# Patient Record
Sex: MALE | Race: OTHER | HISPANIC OR LATINO | URBAN - METROPOLITAN AREA
[De-identification: names, ages, dates, MRNs, and addresses within clinical notes are randomized per-mention and may not be internally consistent; named-entity substitution may affect disease eponyms.]

---

## 2021-08-26 PROBLEM — Z00.00 ENCOUNTER FOR PREVENTIVE HEALTH EXAMINATION: Status: ACTIVE | Noted: 2021-08-26

## 2021-09-13 ENCOUNTER — INPATIENT (INPATIENT)
Facility: HOSPITAL | Age: 86
LOS: 3 days | Discharge: HOSPICE | DRG: 56 | End: 2021-09-17
Attending: STUDENT IN AN ORGANIZED HEALTH CARE EDUCATION/TRAINING PROGRAM | Admitting: STUDENT IN AN ORGANIZED HEALTH CARE EDUCATION/TRAINING PROGRAM
Payer: MEDICARE

## 2021-09-13 VITALS
TEMPERATURE: 99 F | OXYGEN SATURATION: 100 % | HEIGHT: 69 IN | WEIGHT: 145.06 LBS | SYSTOLIC BLOOD PRESSURE: 96 MMHG | HEART RATE: 66 BPM | DIASTOLIC BLOOD PRESSURE: 57 MMHG | RESPIRATION RATE: 20 BRPM

## 2021-09-13 LAB
ALBUMIN SERPL ELPH-MCNC: 3.3 G/DL — SIGNIFICANT CHANGE UP (ref 3.3–5)
ALP SERPL-CCNC: 79 U/L — SIGNIFICANT CHANGE UP (ref 40–120)
ALT FLD-CCNC: <5 U/L — LOW (ref 10–45)
ANION GAP SERPL CALC-SCNC: 12 MMOL/L — SIGNIFICANT CHANGE UP (ref 5–17)
APPEARANCE UR: ABNORMAL
AST SERPL-CCNC: 8 U/L — LOW (ref 10–40)
BACTERIA # UR AUTO: ABNORMAL /HPF
BASOPHILS # BLD AUTO: 0.05 K/UL — SIGNIFICANT CHANGE UP (ref 0–0.2)
BASOPHILS NFR BLD AUTO: 0.6 % — SIGNIFICANT CHANGE UP (ref 0–2)
BILIRUB SERPL-MCNC: 0.2 MG/DL — SIGNIFICANT CHANGE UP (ref 0.2–1.2)
BILIRUB UR-MCNC: NEGATIVE — SIGNIFICANT CHANGE UP
BUN SERPL-MCNC: 15 MG/DL — SIGNIFICANT CHANGE UP (ref 7–23)
CALCIUM SERPL-MCNC: 8.7 MG/DL — SIGNIFICANT CHANGE UP (ref 8.4–10.5)
CHLORIDE SERPL-SCNC: 100 MMOL/L — SIGNIFICANT CHANGE UP (ref 96–108)
CK MB CFR SERPL CALC: 2 NG/ML — SIGNIFICANT CHANGE UP (ref 0–6.7)
CK SERPL-CCNC: 35 U/L — SIGNIFICANT CHANGE UP (ref 30–200)
CO2 SERPL-SCNC: 17 MMOL/L — LOW (ref 22–31)
COLOR SPEC: YELLOW — SIGNIFICANT CHANGE UP
CREAT SERPL-MCNC: 2.41 MG/DL — HIGH (ref 0.5–1.3)
DIFF PNL FLD: ABNORMAL
EOSINOPHIL # BLD AUTO: 0.6 K/UL — HIGH (ref 0–0.5)
EOSINOPHIL NFR BLD AUTO: 7.2 % — HIGH (ref 0–6)
EPI CELLS # UR: ABNORMAL /HPF (ref 0–5)
GLUCOSE SERPL-MCNC: 117 MG/DL — HIGH (ref 70–99)
GLUCOSE UR QL: NEGATIVE — SIGNIFICANT CHANGE UP
HCT VFR BLD CALC: 26.1 % — LOW (ref 39–50)
HGB BLD-MCNC: 8.6 G/DL — LOW (ref 13–17)
HYALINE CASTS # UR AUTO: ABNORMAL /LPF (ref 0–2)
IMM GRANULOCYTES NFR BLD AUTO: 0.6 % — SIGNIFICANT CHANGE UP (ref 0–1.5)
KETONES UR-MCNC: NEGATIVE — SIGNIFICANT CHANGE UP
LACTATE SERPL-SCNC: 1 MMOL/L — SIGNIFICANT CHANGE UP (ref 0.5–2)
LEUKOCYTE ESTERASE UR-ACNC: ABNORMAL
LYMPHOCYTES # BLD AUTO: 1.21 K/UL — SIGNIFICANT CHANGE UP (ref 1–3.3)
LYMPHOCYTES # BLD AUTO: 14.6 % — SIGNIFICANT CHANGE UP (ref 13–44)
MCHC RBC-ENTMCNC: 30.3 PG — SIGNIFICANT CHANGE UP (ref 27–34)
MCHC RBC-ENTMCNC: 33 GM/DL — SIGNIFICANT CHANGE UP (ref 32–36)
MCV RBC AUTO: 91.9 FL — SIGNIFICANT CHANGE UP (ref 80–100)
MONOCYTES # BLD AUTO: 0.59 K/UL — SIGNIFICANT CHANGE UP (ref 0–0.9)
MONOCYTES NFR BLD AUTO: 7.1 % — SIGNIFICANT CHANGE UP (ref 2–14)
NEUTROPHILS # BLD AUTO: 5.81 K/UL — SIGNIFICANT CHANGE UP (ref 1.8–7.4)
NEUTROPHILS NFR BLD AUTO: 69.9 % — SIGNIFICANT CHANGE UP (ref 43–77)
NITRITE UR-MCNC: NEGATIVE — SIGNIFICANT CHANGE UP
NRBC # BLD: 0 /100 WBCS — SIGNIFICANT CHANGE UP (ref 0–0)
PH UR: 5 — SIGNIFICANT CHANGE UP (ref 5–8)
PLATELET # BLD AUTO: 352 K/UL — SIGNIFICANT CHANGE UP (ref 150–400)
POTASSIUM SERPL-MCNC: 4.3 MMOL/L — SIGNIFICANT CHANGE UP (ref 3.5–5.3)
POTASSIUM SERPL-SCNC: 4.3 MMOL/L — SIGNIFICANT CHANGE UP (ref 3.5–5.3)
PROT SERPL-MCNC: 7 G/DL — SIGNIFICANT CHANGE UP (ref 6–8.3)
PROT UR-MCNC: 100 MG/DL
RBC # BLD: 2.84 M/UL — LOW (ref 4.2–5.8)
RBC # FLD: 16.1 % — HIGH (ref 10.3–14.5)
RBC CASTS # UR COMP ASSIST: ABNORMAL /HPF
SARS-COV-2 RNA SPEC QL NAA+PROBE: NEGATIVE — SIGNIFICANT CHANGE UP
SODIUM SERPL-SCNC: 129 MMOL/L — LOW (ref 135–145)
SP GR SPEC: >=1.03 — SIGNIFICANT CHANGE UP (ref 1–1.03)
TROPONIN T SERPL-MCNC: 0.08 NG/ML — CRITICAL HIGH (ref 0–0.01)
TROPONIN T SERPL-MCNC: 0.08 NG/ML — CRITICAL HIGH (ref 0–0.01)
UROBILINOGEN FLD QL: 0.2 E.U./DL — SIGNIFICANT CHANGE UP
WBC # BLD: 8.31 K/UL — SIGNIFICANT CHANGE UP (ref 3.8–10.5)
WBC # FLD AUTO: 8.31 K/UL — SIGNIFICANT CHANGE UP (ref 3.8–10.5)
WBC UR QL: ABNORMAL /HPF

## 2021-09-13 PROCEDURE — 71045 X-RAY EXAM CHEST 1 VIEW: CPT | Mod: 26

## 2021-09-13 PROCEDURE — 99285 EMERGENCY DEPT VISIT HI MDM: CPT

## 2021-09-13 PROCEDURE — 93010 ELECTROCARDIOGRAM REPORT: CPT

## 2021-09-13 PROCEDURE — 99223 1ST HOSP IP/OBS HIGH 75: CPT | Mod: GC

## 2021-09-13 RX ORDER — SODIUM CHLORIDE 9 MG/ML
1000 INJECTION INTRAMUSCULAR; INTRAVENOUS; SUBCUTANEOUS ONCE
Refills: 0 | Status: COMPLETED | OUTPATIENT
Start: 2021-09-13 | End: 2021-09-13

## 2021-09-13 RX ADMIN — SODIUM CHLORIDE 1000 MILLILITER(S): 9 INJECTION INTRAMUSCULAR; INTRAVENOUS; SUBCUTANEOUS at 21:02

## 2021-09-13 NOTE — ED PROVIDER NOTE - PHYSICAL EXAMINATION
VITAL SIGNS: I have reviewed nursing notes and confirm.  CONSTITUTIONAL: Well-developed;  in no acute distress.   SKIN:  warm and dry, no acute rash. He has no evidence of decubitus ulcerations.   HEAD:  normocephalic, atraumatic.  EYES: PERRL, EOM intact; conjunctiva and sclera clear.  ENT: Dry mucous membranes. No nasal discharge; airway clear.   NECK: Supple; non tender.  CARD: S1, S2 normal; no murmurs, gallops, or rubs. Regular rate and rhythm.   RESP:  Clear to auscultation b/l, no wheezes, rales or rhonchi.  ABD: Normal bowel sounds; soft; non-distended; non-tender; no guarding/ rebound.  : Saleh catheter in place, draining clear yellow urine.   EXT: Normal ROM. No clubbing, cyanosis or edema. 2+ pulses to b/l ue/le.  NEURO: Alert, oriented x1, grossly unremarkable. Moving all extremities.   PSYCH: Cooperative, mood and affect appropriate.

## 2021-09-13 NOTE — H&P ADULT - PROBLEM SELECTOR PLAN 6
- C/w Carvedilol 12.5mg BID, Nifedipine 30mg qd   - Will hold Candesartan 4mg although it appears pt's Cr is at baseline. Can restart if needed

## 2021-09-13 NOTE — H&P ADULT - ATTENDING COMMENTS
:   reviewed pertinent data , h&p  patient seen and examined overnight     1.FTT/ UTI : on ceftriaxone , followup uctx , pending palliative / speech and swallow consults       rest of  plan as above  : [id number]  reviewed pertinent data , h&p  patient seen and examined overnight   pt w/ no complaints, oriented to person, and place ("hospital"), rest of exam as above    1.FTT/ UTI : on ceftriaxone , followup blood and urine ctx , pending palliative / speech and swallow consults       rest of  plan as above

## 2021-09-13 NOTE — ED PROVIDER NOTE - OBJECTIVE STATEMENT
87yo Uzbek-speaking male with pmhx of BPH with chronic indwelling yun catheter (last exchanged 1.5wk ago by VNS), recurrent UTI (last admission 3wk ago at [facility], unknown antibiotic therapy per son, not currently on antibiotics), dementia (alert, oriented to person at baseline, bedbound and dependent in ADLs), CKD, DM2, hyponatremia presents with 3 days of decreased po intake, increased weakness and low blood pressure. History provided by pt's son at bedside. He states that since pt was discharged from [facility], pt has been bedbound. Son has HHA services during the day and cares for pt at night. He became concerned after pt has refused food over the past 2 days and has seemed most confused and weak than baseline. He has been pushing po fluids and pt has been taking his home medications. Son decided to bring pt to ED after he had low blood pressures at home. Pt is on antihypertensive therapy (carvedilol and nifedipine). Pt's son is HCP. He is DNR/DNI and has MOLST at bedside. 87yo Kyrgyz-speaking male with pmhx of BPH with chronic indwelling yun catheter (last exchanged 1.5wk ago by VNS), recurrent UTI (last admission 3wk ago at [facility], unknown antibiotic therapy per son, not currently on antibiotics), dementia (alert, oriented to person at baseline, bedbound and dependent in ADLs), CKD, DM2, hyponatremia presents with 3 days of decreased po intake, increased weakness and low blood pressure. History provided by pt's son at bedside. He states that since pt was discharged from [facility] he has become progressively bedbound. Son has HHA services during the day and cares for pt at night. He became concerned after pt has refused food over the past 2-3 days and has seemed most confused and weak than baseline. Pt has been taking his home medications. Son decided to bring pt to ED after he had low blood pressures (SBP <100mmHg) at home. Pt is on antihypertensive therapy (carvedilol and nifedipine). Pt's son is HCP. He is DNR/DNI and has MOLST at bedside.

## 2021-09-13 NOTE — H&P ADULT - PROBLEM SELECTOR PLAN 11
F: none  E: replete K <4, Mg <2  N: NPO   GI Ppx: Protonix   DVT Ppx: Lovenox   Code status: FULL   Dispo: [facility]

## 2021-09-13 NOTE — H&P ADULT - PROBLEM SELECTOR PLAN 4
Hgb 8.6. Hgb 8.6. Per HIE appears baseline is 7-8. No sign/symptoms of bleeding.   - Monitor CBC   - Maintain active type and screen  - Transfuse for Hgb <7   - F/u iron panel   - F/u B12 and folate

## 2021-09-13 NOTE — H&P ADULT - PROBLEM SELECTOR PLAN 2
H/o recurrent UTIs. Has been hospitalized 3-4x this yr for UTI. Last admission at [facility] 3 weeks ago. During that admission he received IV Abx and was sent home with 1 more day of po Abx. UA positive, although contaminated, likely in setting of chronic yun. Presenting symp could be 2/2 UTI  - Management as above. Would only tx for 3 days

## 2021-09-13 NOTE — H&P ADULT - PROBLEM SELECTOR PLAN 7
Not currently on any medication. Per HIE, glu on bmp is usually . Will hold off on mISS and frequent FSG for now   - F/u HgbA1c

## 2021-09-13 NOTE — H&P ADULT - PROBLEM SELECTOR PLAN 8
H/o chronic hyponatremia. Per HIE Na+ 127-131. Na+ currently at baseline, 129. On Sodium chloride tabs.   - C/w sodium chloride 1g BID   - Monitor BMP

## 2021-09-13 NOTE — H&P ADULT - PROBLEM SELECTOR PLAN 3
AAOx1 at baseline. Concern FTT is 2/2 progressive dementia. Pt is DNR/DNI (MOLST form scanned into alpha) and discussion had with family about hospice, as pt now hospice deepali. Paperwork from PMD says he has been progressively declining over past few months.   - Palliative consult as above   - Obtain collateral from PMD Dr. Maggi Levi AAOx1 at baseline. Concern FTT is 2/2 progressive dementia. Pt is DNR/DNI (MOLST form scanned into alpha) and discussion had with family about hospice, as pt now hospice eligible. Paperwork from PMD says he has been progressively declining over past few months.   - Palliative consult as above   - Obtain collateral from PMD Dr. Maggi Levi

## 2021-09-13 NOTE — ED PROVIDER NOTE - CLINICAL SUMMARY MEDICAL DECISION MAKING FREE TEXT BOX
Pt is afebrile and hemodynamically stable. He has no acute ECG changes. Troponin elevated, but stable on repeat and is likely related to demand ischemia vs decreased renal function. Pt anemic at baseline, Hgb 8.6 today improved from previous. Lactate wnl. He has stable hyponatremia and Cr. Urinalysis with few WBCs, leuk esterase. Urine culture pending. CXR without acute cardiopulmonary abnormality. COVID neg. Blood cultures pending.     discussed all results with pt's son who is at bedside. Feels father has had functional decline at home and is currently having difficulty managing his care. Will admit to Medicine for decreased po intake/weakness.

## 2021-09-13 NOTE — H&P ADULT - NSHPPHYSICALEXAM_GEN_ALL_CORE
VITAL SIGNS:  T(C): 36.4 (09-14-21 @ 03:15), Max: 37 (09-13-21 @ 18:28)  T(F): 97.5 (09-14-21 @ 03:15), Max: 98.6 (09-13-21 @ 18:28)  HR: 66 (09-14-21 @ 03:15) (66 - 69)  BP: 110/56 (09-14-21 @ 03:15) (96/57 - 115/68)  BP(mean): --  RR: 18 (09-14-21 @ 03:15) (17 - 20)  SpO2: 98% (09-14-21 @ 03:15) (98% - 100%)  Wt(kg): --    PHYSICAL EXAM:    Constitutional: elderly male, resting comfortably in bed; NAD  Head: NC/AT  Eyes: PERRL, EOMI, anicteric sclera  ENT: no nasal discharge; uvula midline, no oropharyngeal erythema or exudates; MMM  Neck: supple  Respiratory: CTA B/L; no W/R/R, no retractions  Cardiac: +S1/S2; RRR; no M/R/G; PMI non-displaced  Gastrointestinal: abdomen soft, NT/ND; no rebound or guarding; +BSx4  : yun in place   Extremities: WWP, no clubbing or cyanosis; no peripheral edema  Musculoskeletal: Moves all extremities no joint swelling, tenderness or erythema  Vascular: 2+ radial, femoral, DP/PT pulses B/L  Dermatologic: skin warm, dry and intact; no rashes, wounds, or scars  Neurologic: AAOx1; CNII-XII grossly intact; no focal deficits  Psychiatric: affect and characteristics of appearance, verbalizations, behaviors are appropriate

## 2021-09-13 NOTE — ED ADULT TRIAGE NOTE - OTHER COMPLAINTS
biba from home accompanied by son for decrease appetite x 1 week per son.  Pt has been tolerating some fluids but no solids.  PT had some vomiting earlier in the week.  PT is prone to UTI's, has yun catheter in place.   PT has hx of dementia confused at baseline.

## 2021-09-13 NOTE — H&P ADULT - PROBLEM SELECTOR PLAN 1
Presenting with 3 days of decrease po intake. ROS neg. UA positive, in setting of chronic yun (last replaced 1.5 weeks ago), although contaminated. CXR clear, lactate wnl. FTT likely 2/2 UTI vs progressive dementia. Pt's son spoke with PMD 1 month ago about hospice as pt is hospice eligible per paperwork brought in from PMD Dr. Maggi Levi.   - Ceftriaxone 1g qd x 3 days  - F/u Bcxs, Ucxs    - Palliative consulted   - Speech and swallow consulted as pt high risk for aspiration. Will keep NPO for now

## 2021-09-13 NOTE — H&P ADULT - ASSESSMENT
Pt is an 87yo Bengali speaking male PMH dementia (AAOx1 at baseline, bedbound), BPH with chronic yun (last exchanged 2.5 weeks ago by VNS), recurrent UTIs, DM2, CKD stage 4, neuropathy, chronic hyponatremia, cervical spine dz w/ chronic pain presenting for decreased po intake. Symptoms likely FTT in setting of progression dementia along with UTI (UA positive). Pt will be admitted for further management.

## 2021-09-13 NOTE — ED PROVIDER NOTE - NSICDXPASTMEDICALHX_GEN_ALL_CORE_FT
PAST MEDICAL HISTORY:  CKD (chronic kidney disease)     Diabetes mellitus     History of BPH     HTN (hypertension)     Hyponatremia     Indwelling Saleh catheter present

## 2021-09-13 NOTE — ED ADULT NURSE NOTE - NSIMPLEMENTINTERV_GEN_ALL_ED
Implemented All Fall with Harm Risk Interventions:  [place] to call system. Call bell, personal items and telephone within reach. Instruct patient to call for assistance. Room bathroom lighting operational. Non-slip footwear when patient is off stretcher. Physically safe environment: no spills, clutter or unnecessary equipment. Stretcher in lowest position, wheels locked, appropriate side rails in place. Provide visual cue, wrist band, yellow gown, etc. Monitor gait and stability. Monitor for mental status changes and reorient to person, place, and time. Review medications for side effects contributing to fall risk. Reinforce activity limits and safety measures with patient and family. Provide visual clues: red socks.

## 2021-09-13 NOTE — ED ADULT NURSE NOTE - OBJECTIVE STATEMENT
Patient presents with son in NAD.  As per son patient has not eaten x 3 days.  He admits to 1 episode of vomiting last Thursday.  He denies, fevers, chills, AMS, abdominal pain, chest pain, SOB, palpitations.  He is fully covid 19 vaccinate.d

## 2021-09-13 NOTE — H&P ADULT - HISTORY OF PRESENT ILLNESS
Pt is an 85yo Turkmen speaking male [facility] dementia (AAOx1 at baseline, bedbound), BPH with chronic yun (last exchanged 2.5 weeks ago by VNS), recurrent UTIs, DM2, CKD stage 4, neuropathy, chronic hyponatremia, cervical spine dz w/ chronic pain presenting for decreased po intake. History obtained from pt's son Fer Perez at bedside. Pt had decreased po intake for the past 3 days. Otherwise denies fevers, chills, HA, chest pain, sob, nausea, vomiting, abdominal pain, diarrhea, constipation, dysuria. Pt has been hospitalized at [facility] 3-4x this yr for recurrent UTIs, most recently 3 weeks ago. His son is concerned that his symptoms are related to progressive dementia rather than another UTI. Last month he spoke to the PMD Dr. Levi ([facility] house call physician) who discussed hospice with the son.  Pt currently lives with his son Devon who helps take care of pt. He has a HHA for 10hrs 7 days per week. He is interested in obtaining more care for his father and speaking further about hospice.     ED Course   VS: 98.6, 66, 96/57->112/61, 20, 100%  Labs: hgb 8.6, hct 26.1, eosinophils 600, Na+ 129, HCO3- 17, Cr 2.41, Glu 117, Trops 0.08, UA- positive   Imaging: CXR- clear, EKG- nsr with 1st deg av block   Received: 1L NS      Pt is an 87yo Greek speaking male [facility] dementia (AAOx1 at baseline, bedbound), BPH with chronic yun (last exchanged 1.5 weeks ago by VNS), recurrent UTIs, DM2, CKD stage 4, neuropathy, chronic hyponatremia, cervical spine dz w/ chronic pain presenting for decreased po intake. History obtained from pt's son Fer Perez at bedside. Pt had decreased po intake for the past 3 days. Otherwise denies fevers, chills, HA, chest pain, sob, nausea, vomiting, abdominal pain, diarrhea, constipation, dysuria. Pt has been hospitalized at [facility] 3-4x this yr for recurrent UTIs, most recently 3 weeks ago. His son is concerned that his symptoms are related to progressive dementia rather than another UTI. Last month he spoke to the PMD Dr. Levi ([facility] house call physician) who discussed hospice with the son.  Pt currently lives with his son Devon who helps take care of pt. He has a HHA for 10hrs 7 days per week. He is interested in obtaining more care for his father and speaking further about hospice.     ED Course   VS: 98.6, 66, 96/57->112/61, 20, 100%  Labs: hgb 8.6, hct 26.1, eosinophils 600, Na+ 129, HCO3- 17, Cr 2.41, Glu 117, Trops 0.08, UA- positive   Imaging: CXR- clear, EKG- nsr with 1st deg av block   Received: 1L NS

## 2021-09-13 NOTE — H&P ADULT - NSHPLABSRESULTS_GEN_ALL_CORE
LABS:                         8.6    8.31  )-----------( 352      ( 13 Sep 2021 20:08 )             26.1     09-13    129<L>  |  100  |  15  ----------------------------<  117<H>  4.3   |  17<L>  |  2.41<H>    Ca    8.7      13 Sep 2021 20:08    TPro  7.0  /  Alb  3.3  /  TBili  0.2  /  DBili  x   /  AST  8<L>  /  ALT  <5<L>  /  AlkPhos  79  09-13      Urinalysis Basic - ( 13 Sep 2021 21:48 )    Color: Yellow / Appearance: Cloudy / SG: >=1.030 / pH: x  Gluc: x / Ketone: NEGATIVE  / Bili: Negative / Urobili: 0.2 E.U./dL   Blood: x / Protein: 100 mg/dL / Nitrite: NEGATIVE   Leuk Esterase: Small / RBC: 5-10 /HPF / WBC Many /HPF   Sq Epi: x / Non Sq Epi: 5-10 /HPF / Bacteria: Many /HPF      CARDIAC MARKERS ( 13 Sep 2021 21:10 )  x     / 0.08 ng/mL / x     / x     / x      CARDIAC MARKERS ( 13 Sep 2021 20:08 )  x     / 0.08 ng/mL / 35 U/L / x     / 2.0 ng/mL        Lactate, Blood: 1.0 mmol/L (09-13 @ 20:06)      RADIOLOGY, EKG & ADDITIONAL TESTS: Reviewed

## 2021-09-13 NOTE — H&P ADULT - NSHPSOCIALHISTORY_GEN_ALL_CORE
Lives with son. Bedbound. Has HHA 10hrs per day, 7 days per week.     Denies tobacco, rec drug use, alcohol use

## 2021-09-13 NOTE — H&P ADULT - NSICDXPASTMEDICALHX_GEN_ALL_CORE_FT
PAST MEDICAL HISTORY:  CKD (chronic kidney disease)     Dementia     Diabetes mellitus     History of BPH     HTN (hypertension)     Hyponatremia     Indwelling Saleh catheter present

## 2021-09-14 DIAGNOSIS — F03.90 UNSPECIFIED DEMENTIA WITHOUT BEHAVIORAL DISTURBANCE: ICD-10-CM

## 2021-09-14 DIAGNOSIS — R63.8 OTHER SYMPTOMS AND SIGNS CONCERNING FOOD AND FLUID INTAKE: ICD-10-CM

## 2021-09-14 DIAGNOSIS — R62.7 ADULT FAILURE TO THRIVE: ICD-10-CM

## 2021-09-14 DIAGNOSIS — D63.8 ANEMIA IN OTHER CHRONIC DISEASES CLASSIFIED ELSEWHERE: ICD-10-CM

## 2021-09-14 DIAGNOSIS — T83.511A INFECTION AND INFLAMMATORY REACTION DUE TO INDWELLING URETHRAL CATHETER, INITIAL ENCOUNTER: ICD-10-CM

## 2021-09-14 DIAGNOSIS — N18.4 CHRONIC KIDNEY DISEASE, STAGE 4 (SEVERE): ICD-10-CM

## 2021-09-14 DIAGNOSIS — Z98.890 OTHER SPECIFIED POSTPROCEDURAL STATES: Chronic | ICD-10-CM

## 2021-09-14 DIAGNOSIS — D64.9 ANEMIA, UNSPECIFIED: ICD-10-CM

## 2021-09-14 DIAGNOSIS — G89.29 OTHER CHRONIC PAIN: ICD-10-CM

## 2021-09-14 DIAGNOSIS — E83.42 HYPOMAGNESEMIA: ICD-10-CM

## 2021-09-14 DIAGNOSIS — N40.0 BENIGN PROSTATIC HYPERPLASIA WITHOUT LOWER URINARY TRACT SYMPTOMS: ICD-10-CM

## 2021-09-14 DIAGNOSIS — E87.1 HYPO-OSMOLALITY AND HYPONATREMIA: ICD-10-CM

## 2021-09-14 DIAGNOSIS — E11.9 TYPE 2 DIABETES MELLITUS WITHOUT COMPLICATIONS: ICD-10-CM

## 2021-09-14 DIAGNOSIS — N39.0 URINARY TRACT INFECTION, SITE NOT SPECIFIED: ICD-10-CM

## 2021-09-14 DIAGNOSIS — I10 ESSENTIAL (PRIMARY) HYPERTENSION: ICD-10-CM

## 2021-09-14 LAB
A1C WITH ESTIMATED AVERAGE GLUCOSE RESULT: 4.9 % — SIGNIFICANT CHANGE UP (ref 4–5.6)
ANION GAP SERPL CALC-SCNC: 12 MMOL/L — SIGNIFICANT CHANGE UP (ref 5–17)
BASOPHILS # BLD AUTO: 0.05 K/UL — SIGNIFICANT CHANGE UP (ref 0–0.2)
BASOPHILS NFR BLD AUTO: 0.5 % — SIGNIFICANT CHANGE UP (ref 0–2)
BLD GP AB SCN SERPL QL: NEGATIVE — SIGNIFICANT CHANGE UP
BUN SERPL-MCNC: 17 MG/DL — SIGNIFICANT CHANGE UP (ref 7–23)
CALCIUM SERPL-MCNC: 8.3 MG/DL — LOW (ref 8.4–10.5)
CHLORIDE SERPL-SCNC: 102 MMOL/L — SIGNIFICANT CHANGE UP (ref 96–108)
CO2 SERPL-SCNC: 16 MMOL/L — LOW (ref 22–31)
COVID-19 SPIKE DOMAIN AB INTERP: POSITIVE
COVID-19 SPIKE DOMAIN ANTIBODY RESULT: 164 U/ML — HIGH
CREAT SERPL-MCNC: 2.48 MG/DL — HIGH (ref 0.5–1.3)
EOSINOPHIL # BLD AUTO: 0.68 K/UL — HIGH (ref 0–0.5)
EOSINOPHIL NFR BLD AUTO: 6.5 % — HIGH (ref 0–6)
ESTIMATED AVERAGE GLUCOSE: 94 MG/DL — SIGNIFICANT CHANGE UP (ref 68–114)
FERRITIN SERPL-MCNC: 387 NG/ML — SIGNIFICANT CHANGE UP (ref 30–400)
FOLATE SERPL-MCNC: 19.3 NG/ML — SIGNIFICANT CHANGE UP
GLUCOSE SERPL-MCNC: 89 MG/DL — SIGNIFICANT CHANGE UP (ref 70–99)
HCT VFR BLD CALC: 25.7 % — LOW (ref 39–50)
HGB BLD-MCNC: 8.2 G/DL — LOW (ref 13–17)
IMM GRANULOCYTES NFR BLD AUTO: 0.5 % — SIGNIFICANT CHANGE UP (ref 0–1.5)
IRON SATN MFR SERPL: 40 % — SIGNIFICANT CHANGE UP (ref 16–55)
IRON SATN MFR SERPL: 51 UG/DL — SIGNIFICANT CHANGE UP (ref 45–165)
LYMPHOCYTES # BLD AUTO: 1.25 K/UL — SIGNIFICANT CHANGE UP (ref 1–3.3)
LYMPHOCYTES # BLD AUTO: 12 % — LOW (ref 13–44)
MAGNESIUM SERPL-MCNC: 1.5 MG/DL — LOW (ref 1.6–2.6)
MCHC RBC-ENTMCNC: 29.5 PG — SIGNIFICANT CHANGE UP (ref 27–34)
MCHC RBC-ENTMCNC: 31.9 GM/DL — LOW (ref 32–36)
MCV RBC AUTO: 92.4 FL — SIGNIFICANT CHANGE UP (ref 80–100)
MONOCYTES # BLD AUTO: 0.67 K/UL — SIGNIFICANT CHANGE UP (ref 0–0.9)
MONOCYTES NFR BLD AUTO: 6.4 % — SIGNIFICANT CHANGE UP (ref 2–14)
NEUTROPHILS # BLD AUTO: 7.7 K/UL — HIGH (ref 1.8–7.4)
NEUTROPHILS NFR BLD AUTO: 74.1 % — SIGNIFICANT CHANGE UP (ref 43–77)
NRBC # BLD: 0 /100 WBCS — SIGNIFICANT CHANGE UP (ref 0–0)
PLATELET # BLD AUTO: 382 K/UL — SIGNIFICANT CHANGE UP (ref 150–400)
POTASSIUM SERPL-MCNC: 4 MMOL/L — SIGNIFICANT CHANGE UP (ref 3.5–5.3)
POTASSIUM SERPL-SCNC: 4 MMOL/L — SIGNIFICANT CHANGE UP (ref 3.5–5.3)
RBC # BLD: 2.78 M/UL — LOW (ref 4.2–5.8)
RBC # FLD: 15.9 % — HIGH (ref 10.3–14.5)
RH IG SCN BLD-IMP: POSITIVE — SIGNIFICANT CHANGE UP
SARS-COV-2 IGG+IGM SERPL QL IA: 164 U/ML — HIGH
SARS-COV-2 IGG+IGM SERPL QL IA: POSITIVE
SODIUM SERPL-SCNC: 130 MMOL/L — LOW (ref 135–145)
TIBC SERPL-MCNC: 129 UG/DL — LOW (ref 220–430)
UIBC SERPL-MCNC: 78 UG/DL — LOW (ref 110–370)
VIT B12 SERPL-MCNC: 737 PG/ML — SIGNIFICANT CHANGE UP (ref 232–1245)
WBC # BLD: 10.4 K/UL — SIGNIFICANT CHANGE UP (ref 3.8–10.5)
WBC # FLD AUTO: 10.4 K/UL — SIGNIFICANT CHANGE UP (ref 3.8–10.5)

## 2021-09-14 PROCEDURE — 51703 INSERT BLADDER CATH COMPLEX: CPT

## 2021-09-14 PROCEDURE — 99223 1ST HOSP IP/OBS HIGH 75: CPT

## 2021-09-14 PROCEDURE — 99233 SBSQ HOSP IP/OBS HIGH 50: CPT | Mod: GC

## 2021-09-14 PROCEDURE — 99358 PROLONG SERVICE W/O CONTACT: CPT

## 2021-09-14 RX ORDER — TAMSULOSIN HYDROCHLORIDE 0.4 MG/1
0.4 CAPSULE ORAL EVERY 12 HOURS
Refills: 0 | Status: DISCONTINUED | OUTPATIENT
Start: 2021-09-14 | End: 2021-09-17

## 2021-09-14 RX ORDER — SIMVASTATIN 20 MG/1
10 TABLET, FILM COATED ORAL AT BEDTIME
Refills: 0 | Status: DISCONTINUED | OUTPATIENT
Start: 2021-09-14 | End: 2021-09-17

## 2021-09-14 RX ORDER — MAGNESIUM SULFATE 500 MG/ML
2 VIAL (ML) INJECTION ONCE
Refills: 0 | Status: COMPLETED | OUTPATIENT
Start: 2021-09-14 | End: 2021-09-14

## 2021-09-14 RX ORDER — POLYETHYLENE GLYCOL 3350 17 G/17G
17 POWDER, FOR SOLUTION ORAL EVERY 24 HOURS
Refills: 0 | Status: DISCONTINUED | OUTPATIENT
Start: 2021-09-14 | End: 2021-09-15

## 2021-09-14 RX ORDER — SODIUM CHLORIDE 9 MG/ML
1 INJECTION INTRAMUSCULAR; INTRAVENOUS; SUBCUTANEOUS EVERY 12 HOURS
Refills: 0 | Status: DISCONTINUED | OUTPATIENT
Start: 2021-09-14 | End: 2021-09-17

## 2021-09-14 RX ORDER — CARVEDILOL PHOSPHATE 80 MG/1
0 CAPSULE, EXTENDED RELEASE ORAL
Qty: 0 | Refills: 0 | DISCHARGE

## 2021-09-14 RX ORDER — CARVEDILOL PHOSPHATE 80 MG/1
12.5 CAPSULE, EXTENDED RELEASE ORAL EVERY 12 HOURS
Refills: 0 | Status: DISCONTINUED | OUTPATIENT
Start: 2021-09-14 | End: 2021-09-17

## 2021-09-14 RX ORDER — ACETAMINOPHEN 500 MG
650 TABLET ORAL EVERY 6 HOURS
Refills: 0 | Status: DISCONTINUED | OUTPATIENT
Start: 2021-09-14 | End: 2021-09-17

## 2021-09-14 RX ORDER — FINASTERIDE 5 MG/1
5 TABLET, FILM COATED ORAL DAILY
Refills: 0 | Status: DISCONTINUED | OUTPATIENT
Start: 2021-09-14 | End: 2021-09-17

## 2021-09-14 RX ORDER — NIFEDIPINE 30 MG
30 TABLET, EXTENDED RELEASE 24 HR ORAL DAILY
Refills: 0 | Status: DISCONTINUED | OUTPATIENT
Start: 2021-09-14 | End: 2021-09-17

## 2021-09-14 RX ORDER — ENOXAPARIN SODIUM 100 MG/ML
30 INJECTION SUBCUTANEOUS EVERY 24 HOURS
Refills: 0 | Status: DISCONTINUED | OUTPATIENT
Start: 2021-09-14 | End: 2021-09-17

## 2021-09-14 RX ORDER — CEFTRIAXONE 500 MG/1
1000 INJECTION, POWDER, FOR SOLUTION INTRAMUSCULAR; INTRAVENOUS EVERY 24 HOURS
Refills: 0 | Status: DISCONTINUED | OUTPATIENT
Start: 2021-09-14 | End: 2021-09-15

## 2021-09-14 RX ORDER — PANTOPRAZOLE SODIUM 20 MG/1
40 TABLET, DELAYED RELEASE ORAL
Refills: 0 | Status: DISCONTINUED | OUTPATIENT
Start: 2021-09-14 | End: 2021-09-17

## 2021-09-14 RX ADMIN — ENOXAPARIN SODIUM 30 MILLIGRAM(S): 100 INJECTION SUBCUTANEOUS at 07:13

## 2021-09-14 RX ADMIN — Medication 50 GRAM(S): at 09:33

## 2021-09-14 RX ADMIN — PANTOPRAZOLE SODIUM 40 MILLIGRAM(S): 20 TABLET, DELAYED RELEASE ORAL at 07:06

## 2021-09-14 RX ADMIN — TAMSULOSIN HYDROCHLORIDE 0.4 MILLIGRAM(S): 0.4 CAPSULE ORAL at 07:06

## 2021-09-14 RX ADMIN — TAMSULOSIN HYDROCHLORIDE 0.4 MILLIGRAM(S): 0.4 CAPSULE ORAL at 18:07

## 2021-09-14 RX ADMIN — SIMVASTATIN 10 MILLIGRAM(S): 20 TABLET, FILM COATED ORAL at 22:25

## 2021-09-14 RX ADMIN — FINASTERIDE 5 MILLIGRAM(S): 5 TABLET, FILM COATED ORAL at 11:46

## 2021-09-14 RX ADMIN — SODIUM CHLORIDE 1 GRAM(S): 9 INJECTION INTRAMUSCULAR; INTRAVENOUS; SUBCUTANEOUS at 07:06

## 2021-09-14 RX ADMIN — SODIUM CHLORIDE 1 GRAM(S): 9 INJECTION INTRAMUSCULAR; INTRAVENOUS; SUBCUTANEOUS at 18:07

## 2021-09-14 RX ADMIN — CEFTRIAXONE 100 MILLIGRAM(S): 500 INJECTION, POWDER, FOR SOLUTION INTRAMUSCULAR; INTRAVENOUS at 04:03

## 2021-09-14 RX ADMIN — POLYETHYLENE GLYCOL 3350 17 GRAM(S): 17 POWDER, FOR SOLUTION ORAL at 07:06

## 2021-09-14 RX ADMIN — CARVEDILOL PHOSPHATE 12.5 MILLIGRAM(S): 80 CAPSULE, EXTENDED RELEASE ORAL at 18:07

## 2021-09-14 NOTE — SWALLOW BEDSIDE ASSESSMENT ADULT - ORAL PHASE
with solids only due to poor dentition and difficulty with mastication/Decreased anterior-posterior movement of the bolus/Delayed oral transit time

## 2021-09-14 NOTE — PROGRESS NOTE ADULT - PROBLEM SELECTOR PLAN 11
F: none  E: replete K <4, Mg <2  N: NPO   GI Ppx: Protonix   DVT Ppx: Lovenox   Code status: FULL   Dispo: [facility]

## 2021-09-14 NOTE — PROGRESS NOTE ADULT - PROBLEM SELECTOR PLAN 4
Hgb 8.6. Per HIE appears baseline is 7-8. No sign/symptoms of bleeding.   - Monitor CBC   - Maintain active type and screen  - Transfuse for Hgb <7   - F/u iron panel   - F/u B12 and folate Hgb 8.6. Per HIE appears baseline is 7-8. No sign/symptoms of bleeding.   - Monitor CBC   - Maintain active type and screen  - Transfuse for Hgb <7

## 2021-09-14 NOTE — DIETITIAN NUTRITION RISK NOTIFICATION - TREATMENT: THE FOLLOWING DIET HAS BEEN RECOMMENDED
1. Adv diet when medically feasible to regular diet, consistency per SLP recs   2. rec include [id number] (220 kcals, 10 g protein, 200 H2O), and ensure pudding 1xday (170 kcals, 4 g protein).   3. Monitor %PO intake, and tolerance to ONS   4. Maintain aspiration precautions, elevate "[employer] for meals, appreciate assistance durign meal times   5. Monitor BMp, BG q6hrs, POCT, renal indices, lytes, replete prn       1. Adv diet when medically feasible to regular diet, consistency per SLP recs   2. rec include [id number] (220 kcals, 10 g protein, 200 H2O), and ensure pudding 1xday (170 kcals, 4 g protein).   3. Monitor %PO intake, and tolerance to ONS   4. Maintain aspiration precautions, elevate [place] [employer] for meals, appreciate assistance during meal times   5. Monitor BMp, BG q6hrs, POCT, renal indices, lytes, replete prn

## 2021-09-14 NOTE — PROGRESS NOTE ADULT - PROBLEM SELECTOR PLAN 7
Not currently on any home medication. Per HIE, glu on bmp is usually . Will hold off on mISS and frequent FSG for now   - F/u HgbA1c

## 2021-09-14 NOTE — PROGRESS NOTE ADULT - PROBLEM SELECTOR PLAN 6
- C/w home meds Carvedilol 12.5mg BID, Nifedipine 30mg qd   - Will hold Candesartan 4mg although it appears pt's Cr is at baseline. Can restart if needed

## 2021-09-14 NOTE — CONSULT NOTE ADULT - PROBLEM SELECTOR RECOMMENDATION 4
- Patient with advanced Alzheimer's dementia, FTT, frequent readmissions.  - DNR/DNI.  - MOLST in chart.  - Family meeting tomorrow morning.

## 2021-09-14 NOTE — DIETITIAN INITIAL EVALUATION ADULT. - MALNUTRITION
severe protein calorie malnutrition Meets criteria for PCM in the setting of chronic illness (FTT, dementia) as evidenced by meet <50% EER via PO intake and mod-severe fat and muscle wasting

## 2021-09-14 NOTE — CONSULT NOTE ADULT - PROBLEM SELECTOR RECOMMENDATION 9
- Patient with evidence of progressive functional, cognitive and nutritional decline.  - Secondary to advanced dementia.  - Evidence of cachexia, anasarca.

## 2021-09-14 NOTE — PROGRESS NOTE ADULT - SUBJECTIVE AND OBJECTIVE BOX
Patient is a 86y old  Male who presents with a chief complaint of FTT,UTI (14 Sep 2021 08:38)      INTERVAL HPI/OVERNIGHT EVENTS:    Pt. seen and examined earlier today  Pt. says he feels well, no complaints elicited  ROS limited d/t dementia  Reportedly w/ decreased PO intake prior to admisison    Review of Systems: 12 point review of systems otherwise negative    MEDICATIONS  (STANDING):  carvedilol 12.5 milliGRAM(s) Oral every 12 hours  cefTRIAXone   IVPB 1000 milliGRAM(s) IV Intermittent every 24 hours  enoxaparin Injectable 30 milliGRAM(s) SubCutaneous every 24 hours  finasteride 5 milliGRAM(s) Oral daily  NIFEdipine XL 30 milliGRAM(s) Oral daily  pantoprazole    Tablet 40 milliGRAM(s) Oral before breakfast  polyethylene glycol 3350 17 Gram(s) Oral every 24 hours  simvastatin 10 milliGRAM(s) Oral at bedtime  sodium chloride 1 Gram(s) Oral every 12 hours  tamsulosin 0.4 milliGRAM(s) Oral every 12 hours    MEDICATIONS  (PRN):  acetaminophen   Tablet .. 650 milliGRAM(s) Oral every 6 hours PRN Temp greater or equal to 38C (100.4F), Mild Pain (1 - 3), Moderate Pain (4 - 6)      Allergies    penicillins (Unknown)    Intolerances          Vital Signs Last 24 Hrs  T(C): 36.5 (14 Sep 2021 09:48), Max: 37.3 (14 Sep 2021 05:07)  T(F): 97.7 (14 Sep 2021 09:48), Max: 99.1 (14 Sep 2021 05:07)  HR: 73 (14 Sep 2021 09:48) (66 - 73)  BP: 96/56 (14 Sep 2021 09:48) (96/56 - 115/68)  BP(mean): --  RR: 18 (14 Sep 2021 09:48) (17 - 20)  SpO2: 98% (14 Sep 2021 09:48) (96% - 100%)  CAPILLARY BLOOD GLUCOSE          09-14 @ 07:01  -  09-14 @ 14:10  --------------------------------------------------------  IN: 50 mL / OUT: 0 mL / NET: 50 mL        Physical Exam:  (earlier tdoay)  Daily Height in cm: 175.26 (13 Sep 2021 18:28)    Daily   General:  comfortable-appearing in NAD  HEENT:  MMM  CV:  RRR  Lungs:  CTA B/L  Abdomen:  soft NT ND  Extremities:  no edema B/L LE  Skin:  WWP  :  +Salhe POA  Neuro:  AAOx1 (baseline)    LABS:                        8.2    10.40 )-----------( 382      ( 14 Sep 2021 07:19 )             25.7     09-14    130<L>  |  102  |  17  ----------------------------<  89  4.0   |  16<L>  |  2.48<H>    Ca    8.3<L>      14 Sep 2021 07:19  Mg     1.5     09-14    TPro  7.0  /  Alb  3.3  /  TBili  0.2  /  DBili  x   /  AST  8<L>  /  ALT  <5<L>  /  AlkPhos  79  09-13      Urinalysis Basic - ( 13 Sep 2021 21:48 )    Color: Yellow / Appearance: Cloudy / SG: >=1.030 / pH: x  Gluc: x / Ketone: NEGATIVE  / Bili: Negative / Urobili: 0.2 E.U./dL   Blood: x / Protein: 100 mg/dL / Nitrite: NEGATIVE   Leuk Esterase: Small / RBC: 5-10 /HPF / WBC Many /HPF   Sq Epi: x / Non Sq Epi: 5-10 /HPF / Bacteria: Many /HPF

## 2021-09-14 NOTE — PROGRESS NOTE ADULT - PROBLEM SELECTOR PLAN 1
Presenting with 3 days of decrease po intake. ROS neg. UA positive, in setting of chronic yun (last replaced 1.5 weeks ago), although contaminated. CXR clear, lactate wnl, appears non-toxic. FTT likely 2/2 UTI vs progressive dementia. Pt's son spoke with PMD 1 month ago about hospice as pt is hospice eligible per paperwork brought in from PMD Dr. Maggi Levi.   - Ceftriaxone 1g qd x 3 days  - F/u Bcxs, Ucxs    - Palliative consulted   - Speech and swallow consulted as pt high risk for aspiration. Will keep NPO for now Presenting with 3 days of decrease po intake. ROS neg. UA positive, in setting of chronic yun (last replaced 1.5 weeks ago), although contaminated. CXR clear, lactate wnl, appears non-toxic. FTT likely 2/2 UTI vs progressive dementia. Pt's son spoke with PMD 1 month ago about hospice as pt is hospice eligible per paperwork brought in from PMD Dr. Maggi Levi.   - Ceftriaxone 1g qd x 3 days for now  - F/u Bcxs, Ucxs    - Palliative consulted   - Speech and swallow consulted as pt high risk for aspiration. Will keep NPO for now  - PT consult Presenting with 3 days of decrease po intake. ROS neg. UA positive, in setting of chronic yun (last replaced 1.5 weeks ago), although contaminated. CXR clear, lactate wnl, appears non-toxic. FTT likely 2/2 UTI vs progressive dementia. Pt's son spoke with PMD 1 month ago about hospice as pt is hospice eligible per paperwork brought in from PMD Dr. Maggi Levi.   - Ceftriaxone 1g qd x 3 days for now  - F/u Bcxs, Ucxs    - Palliative consulted   - Speech and swallow rec advance to pureed and thin liquids  - PT consult Presenting with 3 days of decrease po intake. ROS neg. UA positive, in setting of chronic yun (last replaced 1.5 weeks ago), although contaminated. CXR clear, lactate wnl, appears non-toxic. FTT likely 2/2 UTI vs progressive dementia. Pt's son spoke with PMD 1 month ago about hospice as pt is hospice eligible per paperwork brought in from PMD Dr. Maggi Levi.   - Ceftriaxone 1g qd x 3 days for now  - F/u palliative consult  - Speech and swallow rec advance to pureed and thin liquids  - F/u PT consult

## 2021-09-14 NOTE — DIETITIAN INITIAL EVALUATION ADULT. - PROBLEM SELECTOR PLAN 2
H/o recurrent UTIs. Has been hospitalized 3-4x this yr for UTI. Last admission at [facility] 3 weeks ago. During that admission he received IV Abx and was sent home with 1 more day of po Abx. UA positive, although contaminated, likely in setting of chronic yun. Presenting symp could be 2/2 UTI  - Management as above. Would only tx for 3 days

## 2021-09-14 NOTE — SWALLOW BEDSIDE ASSESSMENT ADULT - NS SPL SWALLOW CLINIC TRIAL FT
Pt presents with essentially functional juan miguel-pharyngeal swallow for purees and thin liquids. Pt with difficulty masticating solids due to incomplete dentition. Therefore, puree diet is most appropriate. No further follow up is warranted from this service

## 2021-09-14 NOTE — PROCEDURE NOTE - NSURITECHNIQUE_GU_A_CORE
Proper hand hygiene was performed/Sterile gloves were worn for the duration of the procedure/A sterile drape was used to cover all adjacent areas/The catheter was appropriately lubricated/The catheter was secured with a securement device (e.g. StatLock)/The urinary drainage system is closed at the end of the procedure/The collection bag is below the level of the patient and urinary bladder

## 2021-09-14 NOTE — PROGRESS NOTE ADULT - PROBLEM SELECTOR PLAN 3
AAOx1 at baseline. Concern FTT is 2/2 progressive dementia. Pt is DNR/DNI (MOLST form scanned into alpha) and discussion had with family about hospice, as pt now hospice eligible. Paperwork from PMD says he has been progressively declining over past few months.   - Palliative consult as above   - Obtain collateral from PMD Dr. Maggi Levi

## 2021-09-14 NOTE — PROGRESS NOTE ADULT - PROBLEM SELECTOR PLAN 2
H/o recurrent UTIs. Has been hospitalized 3-4x this yr for UTI. Last admission at [facility] 3 weeks ago. During that admission he received IV Abx and was sent home with 1 more day of po Abx. UA positive, although contaminated, likely in setting of chronic yun.   - Ceftriazone 1g qd x 3 days  - F/u Ucxs H/o recurrent UTIs. Has been hospitalized 3-4x this yr for UTI. Last admission at [facility] 3 weeks ago. During that admission he received IV Abx and was sent home with 1 more day of po Abx. UA positive, although contaminated, likely in setting of chronic yun.   - Ceftriazone 1g qd x 3 days  - Ucx grows >100k CFU/ml E coli H/o recurrent UTIs. Has been hospitalized 3-4x this yr for UTI. Last admission at [facility] 3 weeks ago. During that admission he received IV Abx and was sent home with 1 more day of po Abx. UA positive, although contaminated, likely in setting of chronic yun.   - Ceftriazone 1g qd x 3 days  - Ucx grows >100k CFU/ml E coli  - Yun catheter removed and replaced 9/14 by Urology PA

## 2021-09-14 NOTE — PATIENT PROFILE ADULT - LANGUAGE ASSISTANCE NEEDED
Noted. Continue medical therapy as discussed last week, follow-up as scheduled.   No-Patient/Caregiver offered and refused free interpretation services.

## 2021-09-14 NOTE — PROGRESS NOTE ADULT - PROBLEM SELECTOR PLAN 1
d/t E. coli, d/t Saleh POA; cont. CTX (day #1/7), f/u cultures; Pt. has outpatient Urology f/u appt. w/ Dr. Henson 9/15, will consult in-house for catheter exchange

## 2021-09-14 NOTE — DIETITIAN INITIAL EVALUATION ADULT. - ADD RECOMMEND
1. Adv diet when medically feasible to regular diet, consistency per SLP recs 2. rec include [id number] (220 kcals, 10 g protein, 200 H2O), and ensure pudding 1xday (170 kcals, 4 g protein). 3. Monitor %PO intake, and tolerance to ONS 4. Maintain aspiration precautions, elevate [place] for meals, appreciate assistance durign meal times 5. Monitor BMp, BG q6hrs, POCT, renal indices, lytes, replete prn 1. Adv diet when medically feasible to regular diet, consistency per SLP recs 2. rec include [id number] (220 kcals, 10 g protein, 200 H2O), and ensure pudding 1xday (170 kcals, 4 g protein). 3. Monitor %PO intake, and tolerance to ONS 4. Maintain aspiration precautions, elevate [place] for meals, appreciate assistance during meal times 5. Monitor BMp, BG q6hrs, POCT, renal indices, lytes, replete prn

## 2021-09-14 NOTE — PROGRESS NOTE ADULT - ASSESSMENT
Pt is an 85yo Pashto speaking male PMH dementia (AAOx1 at baseline, bedbound), BPH with chronic yun (last exchanged 2.5 weeks ago by [facility]), recurrent UTIs, DM2, CKD stage 4, neuropathy, chronic hyponatremia, cervical spine dz w/ chronic pain presenting for decreased po intake. FTT in the setting of progressive dementia vs UTI.

## 2021-09-14 NOTE — DIETITIAN INITIAL EVALUATION ADULT. - OTHER CALCULATIONS
ABW used to calculate energy needs due to pt's current body weight within % IBW (90%). Increased needs for malnutrition, UTI

## 2021-09-14 NOTE — CONSULT NOTE ADULT - PROBLEM SELECTOR RECOMMENDATION 2
- Patient with advanced Alzheimer's dementia.  - FAST >6D.  - Poor overall prognosis, especially given frequent readmissions for UTIs.  - Serum albumin 3.  - Prealbumin pending.

## 2021-09-14 NOTE — PROGRESS NOTE ADULT - SUBJECTIVE AND OBJECTIVE BOX
INTERVAL HPI/OVERNIGHT EVENTS:    SUBJECTIVE: Patient seen and examined at bedside. Pt is lying comfortably in bed.    using  pt denies fevers, nausea, vomiting, CP, SOB, abdominal pain.      VITAL SIGNS:  ICU Vital Signs Last 24 Hrs  T(C): 37.3 (14 Sep 2021 05:07), Max: 37.3 (14 Sep 2021 05:07)  T(F): 99.1 (14 Sep 2021 05:07), Max: 99.1 (14 Sep 2021 05:07)  HR: 69 (14 Sep 2021 05:07) (66 - 69)  BP: 99/56 (14 Sep 2021 05:07) (96/57 - 115/68)  RR: 18 (14 Sep 2021 05:07) (17 - 20)  SpO2: 96% (14 Sep 2021 05:07) (96% - 100%)        PHYSICAL EXAM:    General: NAD, frail elderly gentleman  HEENT: NC/AT; PERRL  Neck: supple, no adenopathy  Respiratory: CTA b/l, no wheezes, rales, or crackles  Cardiovascular: +S1/S2; no murmurs, rubs or gallops  Abdomen: soft, NT/ND; +BS x4  : Saleh  Extremities: WWP, 2+ peripheral pulses b/l; no LE edema  Skin: normal color and turgor; no rash  Neurological: Alert and oriented x1 only to name    MEDICATIONS:  MEDICATIONS  (STANDING):  carvedilol 12.5 milliGRAM(s) Oral every 12 hours  cefTRIAXone   IVPB 1000 milliGRAM(s) IV Intermittent every 24 hours  enoxaparin Injectable 30 milliGRAM(s) SubCutaneous every 24 hours  finasteride 5 milliGRAM(s) Oral daily  magnesium sulfate  IVPB 2 Gram(s) IV Intermittent once  NIFEdipine XL 30 milliGRAM(s) Oral daily  pantoprazole    Tablet 40 milliGRAM(s) Oral before breakfast  polyethylene glycol 3350 17 Gram(s) Oral every 24 hours  simvastatin 10 milliGRAM(s) Oral at bedtime  sodium chloride 1 Gram(s) Oral every 12 hours  tamsulosin 0.4 milliGRAM(s) Oral every 12 hours    MEDICATIONS  (PRN):  acetaminophen   Tablet .. 650 milliGRAM(s) Oral every 6 hours PRN Temp greater or equal to 38C (100.4F), Mild Pain (1 - 3), Moderate Pain (4 - 6)      ALLERGIES:  Allergies    penicillins (Unknown)    Intolerances        LABS:                        8.2    10.40 )-----------( 382      ( 14 Sep 2021 07:19 )             25.7     09-14    130<L>  |  102  |  17  ----------------------------<  89  4.0   |  16<L>  |  2.48<H>    Ca    8.3<L>      14 Sep 2021 07:19  Mg     1.5     09-14    TPro  7.0  /  Alb  3.3  /  TBili  0.2  /  DBili  x   /  AST  8<L>  /  ALT  <5<L>  /  AlkPhos  79  09-13      Urinalysis Basic - ( 13 Sep 2021 21:48 )    Color: Yellow / Appearance: Cloudy / SG: >=1.030 / pH: x  Gluc: x / Ketone: NEGATIVE  / Bili: Negative / Urobili: 0.2 E.U./dL   Blood: x / Protein: 100 mg/dL / Nitrite: NEGATIVE   Leuk Esterase: Small / RBC: 5-10 /HPF / WBC Many /HPF   Sq Epi: x / Non Sq Epi: 5-10 /HPF / Bacteria: Many /HPF        RADIOLOGY & ADDITIONAL TESTS:     CXR  IMPRESSION: No acute cardiopulmonary disease process. Cardiomegaly.   INTERVAL HPI/OVERNIGHT EVENTS: MIO    SUBJECTIVE: Patient seen and examined at bedside. Pt is lying comfortably in bed.    using  pt denies fevers, nausea, vomiting, CP, SOB, abdominal pain.      VITAL SIGNS:  ICU Vital Signs Last 24 Hrs  T(C): 37.3 (14 Sep 2021 05:07), Max: 37.3 (14 Sep 2021 05:07)  T(F): 99.1 (14 Sep 2021 05:07), Max: 99.1 (14 Sep 2021 05:07)  HR: 69 (14 Sep 2021 05:07) (66 - 69)  BP: 99/56 (14 Sep 2021 05:07) (96/57 - 115/68)  RR: 18 (14 Sep 2021 05:07) (17 - 20)  SpO2: 96% (14 Sep 2021 05:07) (96% - 100%)        PHYSICAL EXAM:    General: NAD, frail elderly gentleman  HEENT: NC/AT; PERRL  Neck: supple, no adenopathy  Respiratory: CTA b/l, no wheezes, rales, or crackles  Cardiovascular: +S1/S2; no murmurs, rubs or gallops  Abdomen: soft, NT/ND; +BS x4  : Saleh  Extremities: WWP, 2+ peripheral pulses b/l; no LE edema  Skin: normal color and turgor; no rash  Neurological: Alert and oriented x1 only to name    MEDICATIONS:  MEDICATIONS  (STANDING):  carvedilol 12.5 milliGRAM(s) Oral every 12 hours  cefTRIAXone   IVPB 1000 milliGRAM(s) IV Intermittent every 24 hours  enoxaparin Injectable 30 milliGRAM(s) SubCutaneous every 24 hours  finasteride 5 milliGRAM(s) Oral daily  magnesium sulfate  IVPB 2 Gram(s) IV Intermittent once  NIFEdipine XL 30 milliGRAM(s) Oral daily  pantoprazole    Tablet 40 milliGRAM(s) Oral before breakfast  polyethylene glycol 3350 17 Gram(s) Oral every 24 hours  simvastatin 10 milliGRAM(s) Oral at bedtime  sodium chloride 1 Gram(s) Oral every 12 hours  tamsulosin 0.4 milliGRAM(s) Oral every 12 hours    MEDICATIONS  (PRN):  acetaminophen   Tablet .. 650 milliGRAM(s) Oral every 6 hours PRN Temp greater or equal to 38C (100.4F), Mild Pain (1 - 3), Moderate Pain (4 - 6)      ALLERGIES:  Allergies    penicillins (Unknown)    Intolerances        LABS:                        8.2    10.40 )-----------( 382      ( 14 Sep 2021 07:19 )             25.7     09-14    130<L>  |  102  |  17  ----------------------------<  89  4.0   |  16<L>  |  2.48<H>    Ca    8.3<L>      14 Sep 2021 07:19  Mg     1.5     09-14    TPro  7.0  /  Alb  3.3  /  TBili  0.2  /  DBili  x   /  AST  8<L>  /  ALT  <5<L>  /  AlkPhos  79  09-13      Urinalysis Basic - ( 13 Sep 2021 21:48 )    Color: Yellow / Appearance: Cloudy / SG: >=1.030 / pH: x  Gluc: x / Ketone: NEGATIVE  / Bili: Negative / Urobili: 0.2 E.U./dL   Blood: x / Protein: 100 mg/dL / Nitrite: NEGATIVE   Leuk Esterase: Small / RBC: 5-10 /HPF / WBC Many /HPF   Sq Epi: x / Non Sq Epi: 5-10 /HPF / Bacteria: Many /HPF        RADIOLOGY & ADDITIONAL TESTS:     CXR  IMPRESSION: No acute cardiopulmonary disease process. Cardiomegaly.   INTERVAL HPI/OVERNIGHT EVENTS: MIO    SUBJECTIVE: Patient seen and examined at bedside. Pt is lying comfortably in bed.    Using  service patient denies fevers, nausea, vomiting, CP, SOB, abdominal pain.      VITAL SIGNS:  ICU Vital Signs Last 24 Hrs  T(C): 37.3 (14 Sep 2021 05:07), Max: 37.3 (14 Sep 2021 05:07)  T(F): 99.1 (14 Sep 2021 05:07), Max: 99.1 (14 Sep 2021 05:07)  HR: 69 (14 Sep 2021 05:07) (66 - 69)  BP: 99/56 (14 Sep 2021 05:07) (96/57 - 115/68)  RR: 18 (14 Sep 2021 05:07) (17 - 20)  SpO2: 96% (14 Sep 2021 05:07) (96% - 100%)        PHYSICAL EXAM:    General: NAD, frail elderly gentleman  HEENT: NC/AT; PERRL, MMM  Neck: supple, no adenopathy  Respiratory: CTA b/l, no wheezes, rales, or crackles  Cardiovascular: +S1/S2; no murmurs, rubs or gallops  Abdomen: soft, NT/ND; +BS x4  : indwelling yun catheter  Extremities: 2+ peripheral pulses b/l; no LE edema  Skin: normal color and turgor; no rash  Neurological: Alert and oriented x1 only to name    MEDICATIONS:  MEDICATIONS  (STANDING):  carvedilol 12.5 milliGRAM(s) Oral every 12 hours  cefTRIAXone   IVPB 1000 milliGRAM(s) IV Intermittent every 24 hours  enoxaparin Injectable 30 milliGRAM(s) SubCutaneous every 24 hours  finasteride 5 milliGRAM(s) Oral daily  magnesium sulfate  IVPB 2 Gram(s) IV Intermittent once  NIFEdipine XL 30 milliGRAM(s) Oral daily  pantoprazole    Tablet 40 milliGRAM(s) Oral before breakfast  polyethylene glycol 3350 17 Gram(s) Oral every 24 hours  simvastatin 10 milliGRAM(s) Oral at bedtime  sodium chloride 1 Gram(s) Oral every 12 hours  tamsulosin 0.4 milliGRAM(s) Oral every 12 hours    MEDICATIONS  (PRN):  acetaminophen   Tablet .. 650 milliGRAM(s) Oral every 6 hours PRN Temp greater or equal to 38C (100.4F), Mild Pain (1 - 3), Moderate Pain (4 - 6)      ALLERGIES:  Allergies    penicillins (Unknown)    LABS:                        8.2    10.40 )-----------( 382      ( 14 Sep 2021 07:19 )             25.7     09-14    130<L>  |  102  |  17  ----------------------------<  89  4.0   |  16<L>  |  2.48<H>    Ca    8.3<L>      14 Sep 2021 07:19  Mg     1.5     09-14    TPro  7.0  /  Alb  3.3  /  TBili  0.2  /  DBili  x   /  AST  8<L>  /  ALT  <5<L>  /  AlkPhos  79  09-13      Urinalysis Basic - ( 13 Sep 2021 21:48 )    Color: Yellow / Appearance: Cloudy / SG: >=1.030 / pH: x  Gluc: x / Ketone: NEGATIVE  / Bili: Negative / Urobili: 0.2 E.U./dL   Blood: x / Protein: 100 mg/dL / Nitrite: NEGATIVE   Leuk Esterase: Small / RBC: 5-10 /HPF / WBC Many /HPF   Sq Epi: x / Non Sq Epi: 5-10 /HPF / Bacteria: Many /HPF        RADIOLOGY & ADDITIONAL TESTS:     CXR  IMPRESSION: No acute cardiopulmonary disease process. Cardiomegaly.

## 2021-09-14 NOTE — PROGRESS NOTE ADULT - PROBLEM SELECTOR PLAN 8
H/o chronic hyponatremia. Per HIE Na+ 127-131. Na+ currently at baseline, 129. On Sodium chloride tabs.   - C/w home med sodium chloride 1g BID   - Monitor BMP

## 2021-09-14 NOTE — DIETITIAN INITIAL EVALUATION ADULT. - PROBLEM SELECTOR PLAN 4
Hgb 8.6. Per HIE appears baseline is 7-8. No sign/symptoms of bleeding.   - Monitor CBC   - Maintain active type and screen  - Transfuse for Hgb <7   - F/u iron panel   - F/u B12 and folate

## 2021-09-14 NOTE — DIETITIAN INITIAL EVALUATION ADULT. - OTHER INFO
Pt is an 87yo Anguillan speaking male PMH dementia (AAOx1 at baseline, bedbound), BPH with chronic yun (last exchanged 2.5 weeks ago by [facility]), recurrent UTIs, DM2, CKD stage 4, neuropathy, chronic hyponatremia, cervical spine dz w/ chronic pain presenting for decreased po intake. Symptoms likely FTT in setting of progression dementia along with UTI (UA positive). Pt will be admitted for further management. S/p SLP eval 9/14, rec pureed diet with thin liquids.    On assessment, pt seen resting in bed. Pt AAOx1 at baseline. NFPE performed on pt, noted mod-severe wt loss. Spoke to pt's son, report pt to have minimal intake for several months. Reported pt to have weighed 155 lbs earlier this year, current adm wt of 145 lbs, indicates wt loss of 10 lbs. Reports fluctuating intake, pt has good and bad days per son. Some days pt able to eat more than a couple bites but recently has had little to no intake. Reports providing Ensures for pt to consume but reports pt to not have consumed. Pt is an 87yo Sierra Leonean speaking male PMH dementia (AAOx1 at baseline, bedbound), BPH with chronic yun (last exchanged 2.5 weeks ago by [facility]), recurrent UTIs, DM2, CKD stage 4, neuropathy, chronic hyponatremia, cervical spine dz w/ chronic pain presenting for decreased po intake. Symptoms likely FTT in setting of progression dementia along with UTI (UA positive). Pt will be admitted for further management. S/p SLP eval 9/14, rec pureed diet with thin liquids.    On assessment, pt seen resting in bed. Pt AAOx1 at baseline. NFPE performed on pt, noted mod-severe wt loss. Spoke to pt's son, report pt to have minimal intake for several months. Reported pt to have weighed 155 lbs earlier this year, current adm wt of 145 lbs, indicates wt loss of 10 lbs. Reports fluctuating intake, pt has good and bad days per son. Some days pt able to eat more than a couple bites but recently has had little to no intake. Reports providing Ensures for pt to drink but reports pt to not have consumed when placed in front of him. Pt's son denied changes in chewing or swallowing functions, reports pt to need pureed diet, and concern for aspiration, need for assistance during meals. At time of assessment, pt NPO, awaiting SLP eval. Now pending order for pureed diet with thin liquids per SLP recs. No reported pain, n/v/d/c. Please below for recs. RD to follow per protocol. Pt is an 87yo Kuwaiti speaking male PMH dementia (AAOx1 at baseline, bedbound), BPH with chronic yun (last exchanged 2.5 weeks ago by [facility]), recurrent UTIs, DM2, CKD stage 4, neuropathy, chronic hyponatremia, cervical spine dz w/ chronic pain presenting for decreased po intake. Symptoms likely FTT in setting of progression dementia along with UTI (UA positive). Pt will be admitted for further management. S/p SLP magda 9/14, rec pureed diet with thin liquids.    On assessment, pt seen resting in bed. Pt AAOx1 at baseline. NFPE performed on pt, noted mod-severe wt loss. Spoke to pt's son, report pt to have minimal intake for several months. Reported pt to have weighed 155 lbs earlier this year, current adm wt of 145 lbs, indicates wt loss of 10 lbs. Reports fluctuating intake, pt has good and bad days per son. Some days pt able to eat more than a couple bites but recently has had little to no intake. Reports providing Ensures for pt to drink but reports pt to not have consumed when placed in front of him. Pt's son denied changes in chewing or swallowing functions, reports pt to need pureed diet, and concern for aspiration, need for assistance during meals. d/w team. At time of assessment, pt NPO, awaiting SLP magda. Now pending order for pureed diet with thin liquids per SLP recs. No reported pain, n/v/d/c. Please below for recs. RD to follow per protocol.

## 2021-09-15 ENCOUNTER — TRANSCRIPTION ENCOUNTER (OUTPATIENT)
Age: 86
End: 2021-09-15

## 2021-09-15 ENCOUNTER — APPOINTMENT (OUTPATIENT)
Dept: UROLOGY | Facility: CLINIC | Age: 86
End: 2021-09-15

## 2021-09-15 DIAGNOSIS — R82.71 BACTERIURIA: ICD-10-CM

## 2021-09-15 DIAGNOSIS — A04.72 ENTEROCOLITIS DUE TO CLOSTRIDIUM DIFFICILE, NOT SPECIFIED AS RECURRENT: ICD-10-CM

## 2021-09-15 DIAGNOSIS — A49.8 OTHER BACTERIAL INFECTIONS OF UNSPECIFIED SITE: ICD-10-CM

## 2021-09-15 DIAGNOSIS — Z71.89 OTHER SPECIFIED COUNSELING: ICD-10-CM

## 2021-09-15 DIAGNOSIS — E43 UNSPECIFIED SEVERE PROTEIN-CALORIE MALNUTRITION: ICD-10-CM

## 2021-09-15 DIAGNOSIS — Z51.5 ENCOUNTER FOR PALLIATIVE CARE: ICD-10-CM

## 2021-09-15 DIAGNOSIS — R82.79 OTHER ABNORMAL FINDINGS ON MICROBIOLOGICAL EXAMINATION OF URINE: ICD-10-CM

## 2021-09-15 DIAGNOSIS — R53.81 OTHER MALAISE: ICD-10-CM

## 2021-09-15 LAB
-  AMPICILLIN/SULBACTAM: SIGNIFICANT CHANGE UP
-  AMPICILLIN: SIGNIFICANT CHANGE UP
-  CEFAZOLIN: SIGNIFICANT CHANGE UP
-  CEFTRIAXONE: SIGNIFICANT CHANGE UP
-  CIPROFLOXACIN: SIGNIFICANT CHANGE UP
-  ERTAPENEM: SIGNIFICANT CHANGE UP
-  GENTAMICIN: SIGNIFICANT CHANGE UP
-  NITROFURANTOIN: SIGNIFICANT CHANGE UP
-  PIPERACILLIN/TAZOBACTAM: SIGNIFICANT CHANGE UP
-  TOBRAMYCIN: SIGNIFICANT CHANGE UP
-  TRIMETHOPRIM/SULFAMETHOXAZOLE: SIGNIFICANT CHANGE UP
ALBUMIN SERPL ELPH-MCNC: 3 G/DL — LOW (ref 3.3–5)
ALP SERPL-CCNC: 70 U/L — SIGNIFICANT CHANGE UP (ref 40–120)
ALT FLD-CCNC: <5 U/L — LOW (ref 10–45)
ANION GAP SERPL CALC-SCNC: 12 MMOL/L — SIGNIFICANT CHANGE UP (ref 5–17)
AST SERPL-CCNC: 9 U/L — LOW (ref 10–40)
BASOPHILS # BLD AUTO: 0.04 K/UL — SIGNIFICANT CHANGE UP (ref 0–0.2)
BASOPHILS NFR BLD AUTO: 0.4 % — SIGNIFICANT CHANGE UP (ref 0–2)
BILIRUB SERPL-MCNC: 0.2 MG/DL — SIGNIFICANT CHANGE UP (ref 0.2–1.2)
BUN SERPL-MCNC: 14 MG/DL — SIGNIFICANT CHANGE UP (ref 7–23)
CALCIUM SERPL-MCNC: 8.6 MG/DL — SIGNIFICANT CHANGE UP (ref 8.4–10.5)
CHLORIDE SERPL-SCNC: 103 MMOL/L — SIGNIFICANT CHANGE UP (ref 96–108)
CO2 SERPL-SCNC: 17 MMOL/L — LOW (ref 22–31)
CREAT SERPL-MCNC: 2.21 MG/DL — HIGH (ref 0.5–1.3)
CULTURE RESULTS: SIGNIFICANT CHANGE UP
EOSINOPHIL # BLD AUTO: 0.92 K/UL — HIGH (ref 0–0.5)
EOSINOPHIL NFR BLD AUTO: 9.9 % — HIGH (ref 0–6)
GLUCOSE SERPL-MCNC: 78 MG/DL — SIGNIFICANT CHANGE UP (ref 70–99)
HCT VFR BLD CALC: 26.1 % — LOW (ref 39–50)
HGB BLD-MCNC: 8.7 G/DL — LOW (ref 13–17)
IMM GRANULOCYTES NFR BLD AUTO: 0.3 % — SIGNIFICANT CHANGE UP (ref 0–1.5)
LYMPHOCYTES # BLD AUTO: 0.95 K/UL — LOW (ref 1–3.3)
LYMPHOCYTES # BLD AUTO: 10.2 % — LOW (ref 13–44)
MAGNESIUM SERPL-MCNC: 2.3 MG/DL — SIGNIFICANT CHANGE UP (ref 1.6–2.6)
MCHC RBC-ENTMCNC: 30.9 PG — SIGNIFICANT CHANGE UP (ref 27–34)
MCHC RBC-ENTMCNC: 33.3 GM/DL — SIGNIFICANT CHANGE UP (ref 32–36)
MCV RBC AUTO: 92.6 FL — SIGNIFICANT CHANGE UP (ref 80–100)
METHOD TYPE: SIGNIFICANT CHANGE UP
MONOCYTES # BLD AUTO: 0.6 K/UL — SIGNIFICANT CHANGE UP (ref 0–0.9)
MONOCYTES NFR BLD AUTO: 6.5 % — SIGNIFICANT CHANGE UP (ref 2–14)
NEUTROPHILS # BLD AUTO: 6.75 K/UL — SIGNIFICANT CHANGE UP (ref 1.8–7.4)
NEUTROPHILS NFR BLD AUTO: 72.7 % — SIGNIFICANT CHANGE UP (ref 43–77)
NRBC # BLD: 0 /100 WBCS — SIGNIFICANT CHANGE UP (ref 0–0)
ORGANISM # SPEC MICROSCOPIC CNT: SIGNIFICANT CHANGE UP
ORGANISM # SPEC MICROSCOPIC CNT: SIGNIFICANT CHANGE UP
PLATELET # BLD AUTO: 365 K/UL — SIGNIFICANT CHANGE UP (ref 150–400)
POTASSIUM SERPL-MCNC: 4.4 MMOL/L — SIGNIFICANT CHANGE UP (ref 3.5–5.3)
POTASSIUM SERPL-SCNC: 4.4 MMOL/L — SIGNIFICANT CHANGE UP (ref 3.5–5.3)
PROT SERPL-MCNC: 6.2 G/DL — SIGNIFICANT CHANGE UP (ref 6–8.3)
RBC # BLD: 2.82 M/UL — LOW (ref 4.2–5.8)
RBC # FLD: 16 % — HIGH (ref 10.3–14.5)
SODIUM SERPL-SCNC: 132 MMOL/L — LOW (ref 135–145)
SPECIMEN SOURCE: SIGNIFICANT CHANGE UP
WBC # BLD: 9.29 K/UL — SIGNIFICANT CHANGE UP (ref 3.8–10.5)
WBC # FLD AUTO: 9.29 K/UL — SIGNIFICANT CHANGE UP (ref 3.8–10.5)

## 2021-09-15 PROCEDURE — 99233 SBSQ HOSP IP/OBS HIGH 50: CPT

## 2021-09-15 PROCEDURE — 99497 ADVNCD CARE PLAN 30 MIN: CPT | Mod: 25

## 2021-09-15 PROCEDURE — 99498 ADVNCD CARE PLAN ADDL 30 MIN: CPT | Mod: 25

## 2021-09-15 PROCEDURE — 99233 SBSQ HOSP IP/OBS HIGH 50: CPT | Mod: GC

## 2021-09-15 RX ORDER — ONDANSETRON 8 MG/1
4 TABLET, FILM COATED ORAL ONCE
Refills: 0 | Status: COMPLETED | OUTPATIENT
Start: 2021-09-15 | End: 2021-09-15

## 2021-09-15 RX ORDER — VANCOMYCIN HCL 1 G
125 VIAL (EA) INTRAVENOUS EVERY 6 HOURS
Refills: 0 | Status: DISCONTINUED | OUTPATIENT
Start: 2021-09-15 | End: 2021-09-17

## 2021-09-15 RX ADMIN — POLYETHYLENE GLYCOL 3350 17 GRAM(S): 17 POWDER, FOR SOLUTION ORAL at 07:22

## 2021-09-15 RX ADMIN — SODIUM CHLORIDE 1 GRAM(S): 9 INJECTION INTRAMUSCULAR; INTRAVENOUS; SUBCUTANEOUS at 19:11

## 2021-09-15 RX ADMIN — PANTOPRAZOLE SODIUM 40 MILLIGRAM(S): 20 TABLET, DELAYED RELEASE ORAL at 07:20

## 2021-09-15 RX ADMIN — Medication 30 MILLIGRAM(S): at 07:20

## 2021-09-15 RX ADMIN — FINASTERIDE 5 MILLIGRAM(S): 5 TABLET, FILM COATED ORAL at 13:14

## 2021-09-15 RX ADMIN — SODIUM CHLORIDE 1 GRAM(S): 9 INJECTION INTRAMUSCULAR; INTRAVENOUS; SUBCUTANEOUS at 07:21

## 2021-09-15 RX ADMIN — TAMSULOSIN HYDROCHLORIDE 0.4 MILLIGRAM(S): 0.4 CAPSULE ORAL at 18:22

## 2021-09-15 RX ADMIN — CARVEDILOL PHOSPHATE 12.5 MILLIGRAM(S): 80 CAPSULE, EXTENDED RELEASE ORAL at 18:22

## 2021-09-15 RX ADMIN — TAMSULOSIN HYDROCHLORIDE 0.4 MILLIGRAM(S): 0.4 CAPSULE ORAL at 07:21

## 2021-09-15 RX ADMIN — CEFTRIAXONE 100 MILLIGRAM(S): 500 INJECTION, POWDER, FOR SOLUTION INTRAMUSCULAR; INTRAVENOUS at 04:19

## 2021-09-15 RX ADMIN — Medication 125 MILLIGRAM(S): at 19:11

## 2021-09-15 RX ADMIN — ENOXAPARIN SODIUM 30 MILLIGRAM(S): 100 INJECTION SUBCUTANEOUS at 07:19

## 2021-09-15 RX ADMIN — CARVEDILOL PHOSPHATE 12.5 MILLIGRAM(S): 80 CAPSULE, EXTENDED RELEASE ORAL at 07:19

## 2021-09-15 RX ADMIN — ONDANSETRON 4 MILLIGRAM(S): 8 TABLET, FILM COATED ORAL at 18:22

## 2021-09-15 NOTE — PROGRESS NOTE ADULT - PROBLEM SELECTOR PLAN 5
- 60 minutes with patients son at bedside.  - Patient unable to participate due to dementia.  - Goals of care discussed at length.  - DNR/DNI. MOLST in chart.  - Interested in home hospice services.  - Requesting to treat UTI's as they come along, given that patient improves symptomatically when he receives antibiotics.  - If resistance forms and antibiotic therapy is no longer an option, son is in agreement with stopping ABX.  - Goal is to minimize hospitalizations and increase time at home.   - [facility] Hosp[facility] referral made. (Discharge Friday).

## 2021-09-15 NOTE — PROGRESS NOTE ADULT - PROBLEM SELECTOR PLAN 7
Not currently on any home medication. Per HIE, glu on bmp is usually . Will hold off on mISS and frequent FSG for now   - F/u HgbA1c - C/w home meds Carvedilol 12.5mg BID, Nifedipine 30mg qd   - Will hold Candesartan 4mg although it appears pt's Cr is at baseline. Can restart if needed

## 2021-09-15 NOTE — DISCHARGE NOTE PROVIDER - NSDCMRMEDTOKEN_GEN_ALL_CORE_FT
candesartan 4 mg oral tablet: 1 tab(s) orally once a day  carvedilol 12.5 mg oral tablet: 1 tab(s) orally 2 times a day  finasteride 5 mg oral tablet: 1 tab(s) orally once a day (at bedtime)  NIFEdipine 30 mg oral tablet, extended release: 1 tab(s) orally once a day  omeprazole 40 mg oral delayed release capsule: 1 cap(s) orally once a day  polyethylene glycol 3350 with electrolytes oral powder for reconstitution:   simvastatin 10 mg oral tablet: 1 tab(s) orally once a day (at bedtime)  Sodium Chloride 1 g oral tablet:   tamsulosin 0.4 mg oral capsule: 1 cap(s) orally once a day   candesartan 4 mg oral tablet: 1 tab(s) orally once a day  carvedilol 12.5 mg oral tablet: 1 tab(s) orally 2 times a day  finasteride 5 mg oral tablet: 1 tab(s) orally once a day (at bedtime)  NIFEdipine 30 mg oral tablet, extended release: 1 tab(s) orally once a day  omeprazole 40 mg oral delayed release capsule: 1 cap(s) orally once a day  simvastatin 10 mg oral tablet: 1 tab(s) orally once a day (at bedtime)  Sodium Chloride 1 g oral tablet:   tamsulosin 0.4 mg oral capsule: 1 cap(s) orally once a day   carvedilol 12.5 mg oral tablet: 1 tab(s) orally 2 times a day  finasteride 5 mg oral tablet: 1 tab(s) orally once a day (at bedtime)  NIFEdipine 30 mg oral tablet, extended release: 1 tab(s) orally once a day  omeprazole 40 mg oral delayed release capsule: 1 cap(s) orally once a day  simvastatin 10 mg oral tablet: 1 tab(s) orally once a day (at bedtime)  Sodium Chloride 1 g oral tablet:   tamsulosin 0.4 mg oral capsule: 1 cap(s) orally once a day   carvedilol 12.5 mg oral tablet: 1 tab(s) orally 2 times a day  finasteride 5 mg oral tablet: 1 tab(s) orally once a day (at bedtime)  FIRST-Vancomycin 50 oral liquid: 125 milligram(s) (12.5mL) orally every 6 hours x 8 days   NIFEdipine 30 mg oral tablet, extended release: 1 tab(s) orally once a day  omeprazole 40 mg oral delayed release capsule: 1 cap(s) orally once a day  simvastatin 10 mg oral tablet: 1 tab(s) orally once a day (at bedtime)  Sodium Chloride 1 g oral tablet:   tamsulosin 0.4 mg oral capsule: 1 cap(s) orally once a day

## 2021-09-15 NOTE — PROGRESS NOTE ADULT - PROBLEM SELECTOR PLAN 9
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 Takes Tylenol prn.   - C/w Tylenol prn H/o chronic hyponatremia. Per HIE Na+ 127-131. Na+ currently at baseline, 129. On Sodium chloride tabs.   - C/w home med sodium chloride 1g BID   - Monitor BMP

## 2021-09-15 NOTE — PROGRESS NOTE ADULT - PROBLEM SELECTOR PLAN 8
H/o chronic hyponatremia. Per HIE Na+ 127-131. Na+ currently at baseline, 129. On Sodium chloride tabs.   - C/w home med sodium chloride 1g BID   - Monitor BMP Not currently on any home medication. Per HIE, glu on bmp is usually . Will hold off on mISS and frequent FSG for now   - F/u HgbA1c

## 2021-09-15 NOTE — PROGRESS NOTE ADULT - PROBLEM SELECTOR PLAN 10
Per HIE, Baseline Cr 2.3-2.7. 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 Takes Tylenol prn.   - C/w Tylenol prn

## 2021-09-15 NOTE — CONSULT NOTE ADULT - ASSESSMENT
per Internal Medicine     87 yo Icelandic speaking male PMH dementia (AAOx1 at baseline, bedbound), BPH with chronic yun (last exchanged 2.5 weeks ago by [facility]), recurrent UTIs, DM2, CKD stage 4, neuropathy, chronic hyponatremia, cervical spine dz w/ chronic pain presenting for decreased po intake. Patient positive for C diff, wbc wnl and no fever being treated with vanc PO. Will monitor. FTT likely in the setting of progressive dementia.           Problem/Plan - 1:  ·  Problem: Failure to thrive in adult.   ·  Plan: Presenting with 3 days of decrease po intake. ROS neg. UA positive, in setting of chronic yun, although contaminated. CXR clear, lactate wnl, appears non-toxic. FTT likely 2/2  progressive dementia. Pt's son spoke with PMD 1 month ago about hospice as pt is hospice eligible per paperwork brought in from PMD Dr. Maggi Levi.   - F/u palliative consult  - Speech and swallow rec advance to pureed and thin liquids.    Problem/Plan - 2:  ·  Problem: Asymptomatic bacteriuria.   ·  Plan: H/o recurrent UTIs. Has been hospitalized 3-4x this yr for UTI. Last admission at [facility] 3 weeks ago. During that admission he received IV Abx and was sent home with 1 more day of po Abx. UA positive, although contaminated, likely in setting of chronic yun. Patient asymptomatic denying any dysuria, systemic symptoms.  - Ceftriaxone discontinued  - Ucx grows >100k CFU/ml E coli  - Yun catheter removed and replaced 9/14 by Urology PA.    Problem/Plan - 3:  ·  Problem: Clostridium difficile colitis.   ·  Plan: Pt's son reports that pt has had diarrhea within the last two weeks. PCP (Joseluis) reports a positive C diff test from a month ago. Pt currently asymptomatic. WBC wnl, no fever. Denies any diarrhea currently.  -D/c'd ceftriaxone  - Started vanc PO 125mg q6  - isolation precautions started.    Problem/Plan - 4:  ·  Problem: Dementia.   ·  Plan: AAOx1 at baseline. Concern FTT is 2/2 progressive dementia. Pt is DNR/DNI (MOLST form scanned into alpha) and discussion had with family about hospice, as pt now hospice eligible. Paperwork from PMD says he has been progressively declining over past few months.   - Palliative consult as above   - Obtain collateral from PMD Dr. Maggi Levi.    Problem/Plan - 5:  ·  Problem: Anemia.   ·  Plan: Hgb 8.6. Per HIE appears baseline is 7-8. No sign/symptoms of bleeding.   - Monitor CBC   - Maintain active type and screen  - Transfuse for Hgb <7.    Problem/Plan - 6:  ·  Problem: BPH (benign prostatic hyperplasia).   ·  Plan: - C/w home meds Flomax 0.4mg BID and Finasteride 5mg.    Problem/Plan - 7:  ·  Problem: Hypertension.   ·  Plan: - C/w home meds Carvedilol 12.5mg BID, Nifedipine 30mg qd   - Will hold Candesartan 4mg although it appears pt's Cr is at baseline. Can restart if needed.    Problem/Plan - 8:  ·  Problem: Type 2 diabetes mellitus.   ·  Plan: Not currently on any home medication. Per HIE, glu on bmp is usually . Will hold off on mISS and frequent FSG for now   - F/u HgbA1c.    Problem/Plan - 9:  ·  Problem: Hyponatremia.   ·  Plan: H/o chronic hyponatremia. Per HIE Na+ 127-131. Na+ currently at baseline, 129. On Sodium chloride tabs.   - C/w home med sodium chloride 1g BID   - Monitor BMP.    Problem/Plan - 10:  ·  Problem: Chronic pain.   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 Takes Tylenol prn.   - C/w Tylenol prn.    Problem/Plan - 11:  ·  Problem: Stage 4 chronic kidney disease.   ·  Plan: Per HIE, Baseline Cr 2.3-2.7. Pt currently at baseline with Cr 2.4   - Monitor BMP.    Problem/Plan - 12:  ·  Problem: Nutrition, metabolism, and development symptoms.   ·  Plan: F: none  E: replete K <4, Mg <2  N: NPO   GI Ppx: Protonix   DVT Ppx: Lovenox   Code status: FULL   Dispo: [facility].    
86 M with Alzheimer's dementia, FTT, debility, encounter for palliative care.

## 2021-09-15 NOTE — PROGRESS NOTE ADULT - SUBJECTIVE AND OBJECTIVE BOX
Patient is a 86y old  Male who presents with a chief complaint of FTT (15 Sep 2021 11:04)      INTERVAL HPI/OVERNIGHT EVENTS:    Pt. seen and examined earlier today  Pt. had no complaints, says he feels well; ROS limited d/t dementia  Pt. had NBNB N/V x2 this AM, per report  Pt. had poor PO intake and diarrhea prior to admission, per report  No diarrhea during hospitalization, per RN report    Review of Systems: 12 point review of systems otherwise negative    MEDICATIONS  (STANDING):  carvedilol 12.5 milliGRAM(s) Oral every 12 hours  enoxaparin Injectable 30 milliGRAM(s) SubCutaneous every 24 hours  finasteride 5 milliGRAM(s) Oral daily  NIFEdipine XL 30 milliGRAM(s) Oral daily  pantoprazole    Tablet 40 milliGRAM(s) Oral before breakfast  simvastatin 10 milliGRAM(s) Oral at bedtime  sodium chloride 1 Gram(s) Oral every 12 hours  tamsulosin 0.4 milliGRAM(s) Oral every 12 hours  vancomycin    Solution 125 milliGRAM(s) Oral every 6 hours    MEDICATIONS  (PRN):  acetaminophen   Tablet .. 650 milliGRAM(s) Oral every 6 hours PRN Temp greater or equal to 38C (100.4F), Mild Pain (1 - 3), Moderate Pain (4 - 6)      Allergies    penicillins (Unknown)    Intolerances          Vital Signs Last 24 Hrs  T(C): 36.4 (15 Sep 2021 08:44), Max: 36.6 (14 Sep 2021 17:34)  T(F): 97.6 (15 Sep 2021 08:44), Max: 97.8 (14 Sep 2021 17:34)  HR: 77 (15 Sep 2021 08:44) (70 - 77)  BP: 126/69 (15 Sep 2021 08:44) (115/58 - 127/66)  BP(mean): --  RR: 18 (15 Sep 2021 08:44) (17 - 18)  SpO2: 99% (15 Sep 2021 08:44) (99% - 100%)  CAPILLARY BLOOD GLUCOSE          09-14 @ 07:01  -  09-15 @ 07:00  --------------------------------------------------------  IN: 50 mL / OUT: 0 mL / NET: 50 mL    09-15 @ 07:01  -  09-15 @ 14:40  --------------------------------------------------------  IN: 0 mL / OUT: 300 mL / NET: -300 mL        Physical Exam:  (earlier today)  Daily     Daily   General:  comfortable-appearing in NAD  HEENT:  MMM  CV:  RRR  Lungs:  CTA B/L  Abdomen:  soft NT ND  Extremities:  no edema B/L LE  Skin:  WWP  :  +Therese HUMPHREYSA  Neuro:  AAOx1 (baseline)    LABS:                        8.7    9.29  )-----------( 365      ( 15 Sep 2021 08:54 )             26.1     09-15    132<L>  |  103  |  14  ----------------------------<  78  4.4   |  17<L>  |  2.21<H>    Ca    8.6      15 Sep 2021 08:54  Mg     2.3     09-15    TPro  6.2  /  Alb  3.0<L>  /  TBili  0.2  /  DBili  x   /  AST  9<L>  /  ALT  <5<L>  /  AlkPhos  70  09-15      Urinalysis Basic - ( 13 Sep 2021 21:48 )    Color: Yellow / Appearance: Cloudy / SG: >=1.030 / pH: x  Gluc: x / Ketone: NEGATIVE  / Bili: Negative / Urobili: 0.2 E.U./dL   Blood: x / Protein: 100 mg/dL / Nitrite: NEGATIVE   Leuk Esterase: Small / RBC: 5-10 /HPF / WBC Many /HPF   Sq Epi: x / Non Sq Epi: 5-10 /HPF / Bacteria: Many /HPF

## 2021-09-15 NOTE — PROGRESS NOTE ADULT - SUBJECTIVE AND OBJECTIVE BOX
SUBJECTIVE AND OBJECTIVE:  No complaints  Fatigue  INTERVAL HPI/OVERNIGHT EVENTS:  Remains on antibiotics.  Minimally verbal.   Son at bedside.   DNR on chart:   Allergies    penicillins (Unknown)    Intolerances    MEDICATIONS  (STANDING):  carvedilol 12.5 milliGRAM(s) Oral every 12 hours  enoxaparin Injectable 30 milliGRAM(s) SubCutaneous every 24 hours  finasteride 5 milliGRAM(s) Oral daily  NIFEdipine XL 30 milliGRAM(s) Oral daily  pantoprazole    Tablet 40 milliGRAM(s) Oral before breakfast  simvastatin 10 milliGRAM(s) Oral at bedtime  sodium chloride 1 Gram(s) Oral every 12 hours  tamsulosin 0.4 milliGRAM(s) Oral every 12 hours  vancomycin    Solution 125 milliGRAM(s) Oral every 6 hours    MEDICATIONS  (PRN):  acetaminophen   Tablet .. 650 milliGRAM(s) Oral every 6 hours PRN Temp greater or equal to 38C (100.4F), Mild Pain (1 - 3), Moderate Pain (4 - 6)      ITEMS UNCHECKED ARE NOT PRESENT    PRESENT SYMPTOMS: [x ]Unable to obtain due to poor mentation   Source if other than patient:  [ ]Family   [ ]Team     Pain (Impact on QOL):    Location:  Minimal acceptable level (0-10 scale):                   Aggravating factors:  Quality:  Radiation:  Severity (0-10 scale):    Timing:    Dyspnea:                           [ ]Mild [ ]Moderate [ ]Severe  Anxiety:                             [ ]Mild [ ]Moderate [ ]Severe  Fatigue:                             [ ]Mild [ ]Moderate [ ]Severe  Nausea:                             [ ]Mild [ ]Moderate [ ]Severe  Loss of appetite:              [ ]Mild [ ]Moderate [ ]Severe  Constipation:                    [ ]Mild [ ]Moderate [ ]Severe  Grief Present                    [ ] Yes  [ ] No   PAIN AD Score:	  [email or web address] (Ctrl + left click to view)    Other Symptoms:  [ x]All other review of systems negative     Karnofsky Performance Score/Palliative Performance Status Version 2:   30      %    http://palliative.info/resource_material/[email or web address].pdf  PHYSICAL EXAM:  Vital Signs Last 24 Hrs  T(C): 36.4 (15 Sep 2021 20:55), Max: 36.7 (15 Sep 2021 16:29)  T(F): 97.6 (15 Sep 2021 20:55), Max: 98 (15 Sep 2021 16:29)  HR: 66 (15 Sep 2021 20:55) (66 - 77)  BP: 127/65 (15 Sep 2021 20:55) (121/63 - 127/66)  BP(mean): --  RR: 18 (15 Sep 2021 20:55) (17 - 18)  SpO2: 99% (15 Sep 2021 20:55) (99% - 100%) I&O's Summary    14 Sep 2021 07:01  -  15 Sep 2021 07:00  --------------------------------------------------------  IN: 50 mL / OUT: 0 mL / NET: 50 mL    15 Sep 2021 07:01  -  15 Sep 2021 23:53  --------------------------------------------------------  IN: 0 mL / OUT: 300 mL / NET: -300 mL     GENERAL:  [ ]Alert  [ ]Oriented x   [x ]Lethargic  [x ]Cachexia  [ ]Unarousable  [ ]Verbal  [ ]Non-Verbal  Behavioral:   [ ] Anxiety  [ ] Delirium [ ] Agitation [x ] Other  HEENT:  [ ]Normal   [x ]Dry mouth   [ ]ET Tube/Trach  [ ]Oral lesions  PULMONARY:   [ x]Clear [ ]Tachypnea  [ ]Audible excessive secretions   [ ]Rhonchi        [ ]Right [ ]Left [ ]Bilateral  [ ]Crackles        [ ]Right [ ]Left [ ]Bilateral  [ ]Wheezing     [ ]Right [ ]Left [ ]Bilateral  CARDIOVASCULAR:    [ x]Regular [ ]Irregular [ ]Tachy  [ ]Jonathan [ ]Murmur [ ]Other  GASTROINTESTINAL:  [x ]Soft  [ ]Distended   [x ]+BS  [x ]Non tender [ ]Tender  [ ]PEG [ ]OGT/ NGT   Last BM:  GENITOURINARY:  [ ]Normal [ ] Incontinent   [ ]Oliguria/Anuria   [ x]Saleh  MUSCULOSKELETAL:   [ ]Normal   [ ]Weakness  [x ]Bed/Wheelchair bound [ ]Edema  NEUROLOGIC:   [ ]No focal deficits  [x ] Cognitive impairment  [ ] Dysphagia [ ]Dysarthria [ ] Paresis [ ]Other   SKIN:   [x ]Normal   [ ]Pressure ulcer(s)  [ ]Rash    CRITICAL CARE:  [ ] Shock Present  [ ]Septic [ ]Cardiogenic [ ]Neurologic [ ]Hypovolemic  [ ]  Vasopressors [ ]  Inotropes   [ ] Respiratory failure present  [ ] Acute  [ ] Chronic [ ] Hypoxic  [ ] Hypercarbic [ ] Other  [ ] Other organ failure     LABS:                        8.7    9.29  )-----------( 365      ( 15 Sep 2021 08:54 )             26.1   09-15    132<L>  |  103  |  14  ----------------------------<  78  4.4   |  17<L>  |  2.21<H>    Ca    8.6      15 Sep 2021 08:54  Mg     2.3     09-15    TPro  6.2  /  Alb  3.0<L>  /  TBili  0.2  /  DBili  x   /  AST  9<L>  /  ALT  <5<L>  /  AlkPhos  70  09-15        RADIOLOGY & ADDITIONAL STUDIES:    Protein Calorie Malnutrition Present: [ ] yes [ ] no  [ ] PPSV2 < or = 30%  [ ] significant weight loss [ ] poor nutritional intake [ ] anasarca [ ] catabolic state Artificial Nutrition [ ]     REFERRALS:   [ ]Chaplaincy  [ ] Hospice  [ ]Child Life  [ ]Social Work  [ ]Case management [ ]Holistic Therapy   Goals of Care Document:

## 2021-09-15 NOTE — PROGRESS NOTE ADULT - PROBLEM SELECTOR PLAN 6
- C/w home meds Carvedilol 12.5mg BID, Nifedipine 30mg qd   - Will hold Candesartan 4mg although it appears pt's Cr is at baseline. Can restart if needed - C/w home meds Flomax 0.4mg BID and Finasteride 5mg

## 2021-09-15 NOTE — PROGRESS NOTE ADULT - PROBLEM SELECTOR PLAN 2
+E. coli; likely colonized; Saleh catheter POA exchanged 9/14 by Urology PA; no e/o UTI, no need for abx, especially in setting of C. diff

## 2021-09-15 NOTE — PROGRESS NOTE ADULT - PROBLEM SELECTOR PLAN 3
AAOx1 at baseline. Concern FTT is 2/2 progressive dementia. Pt is DNR/DNI (MOLST form scanned into alpha) and discussion had with family about hospice, as pt now hospice eligible. Paperwork from PMD says he has been progressively declining over past few months.   - Palliative consult as above   - Obtain collateral from PMD Dr. Maggi Levi Pt's son reports that pt has had diarrhea within the last two weeks. PCP (Joseluis) reports a positive C diff test from a month ago. Pt currently asymptomatic. WBC wnl, no fever. Denies any diarrhea currently.  -D/c'd ceftriaxone  - Start oral vanc  - isolation precautions started Pt's son reports that pt has had diarrhea within the last two weeks. PCP (Joseluis) reports a positive C diff test from a month ago. Pt currently asymptomatic. WBC wnl, no fever. Denies any diarrhea currently.  -D/c'd ceftriaxone  - Started vanc PO 125mg q6  - isolation precautions started

## 2021-09-15 NOTE — CONSULT NOTE ADULT - SUBJECTIVE AND OBJECTIVE BOX
Patient is a 86y old  Male who presents with a chief complaint of FTT (15 Sep 2021 11:04)       HPI:  Pt is an 87yo Stateless speaking male PMH dementia (AAOx1 at baseline, bedbound), BPH with chronic yun (last exchanged 1.5 weeks ago by VNS), recurrent UTIs, DM2, CKD stage 4, neuropathy, chronic hyponatremia, cervical spine dz w/ chronic pain presenting for decreased po intake. History obtained from pt's son Fer Perez at bedside. Pt had decreased po intake for the past 3 days. Otherwise denies fevers, chills, HA, chest pain, sob, nausea, vomiting, abdominal pain, diarrhea, constipation, dysuria. Pt has been hospitalized at [facility] 3-4x this yr for recurrent UTIs, most recently 3 weeks ago. His son is concerned that his symptoms are related to progressive dementia rather than another UTI. Last month he spoke to the PMD Dr. Levi ([facility] house call physician) who discussed hospice with the son.  Pt currently lives with his son Devon who helps take care of pt. He has a HHA for 10hrs 7 days per week. He is interested in obtaining more care for his father and speaking further about hospice.     ED Course   VS: 98.6, 66, 96/57->112/61, 20, 100%  Labs: hgb 8.6, hct 26.1, eosinophils 600, Na+ 129, HCO3- 17, Cr 2.41, Glu 117, Trops 0.08, UA- positive   Imaging: CXR- clear, EKG- nsr with 1st deg av block   Received: 1L NS      (13 Sep 2021 23:14)      PAST MEDICAL & SURGICAL HISTORY:  HTN (hypertension)    Diabetes mellitus    CKD (chronic kidney disease)    History of BPH    Indwelling Yun catheter present    Hyponatremia    Dementia    H/O Spinal surgery        MEDICATIONS  (STANDING):  carvedilol 12.5 milliGRAM(s) Oral every 12 hours  enoxaparin Injectable 30 milliGRAM(s) SubCutaneous every 24 hours  finasteride 5 milliGRAM(s) Oral daily  NIFEdipine XL 30 milliGRAM(s) Oral daily  pantoprazole    Tablet 40 milliGRAM(s) Oral before breakfast  simvastatin 10 milliGRAM(s) Oral at bedtime  sodium chloride 1 Gram(s) Oral every 12 hours  tamsulosin 0.4 milliGRAM(s) Oral every 12 hours  vancomycin    Solution 125 milliGRAM(s) Oral every 6 hours    MEDICATIONS  (PRN):  acetaminophen   Tablet .. 650 milliGRAM(s) Oral every 6 hours PRN Temp greater or equal to 38C (100.4F), Mild Pain (1 - 3), Moderate Pain (4 - 6)    FAMILY HISTORY:  No pertinent family history in first degree relatives        CBC Full  -  ( 15 Sep 2021 08:54 )  WBC Count : 9.29 K/uL  RBC Count : 2.82 M/uL  Hemoglobin : 8.7 g/dL  Hematocrit : 26.1 %  Platelet Count - Automated : 365 K/uL  Mean Cell Volume : 92.6 fl  Mean Cell Hemoglobin : 30.9 pg  Mean Cell Hemoglobin Concentration : 33.3 gm/dL  Auto Neutrophil # : 6.75 K/uL  Auto Lymphocyte # : 0.95 K/uL  Auto Monocyte # : 0.60 K/uL  Auto Eosinophil # : 0.92 K/uL  Auto Basophil # : 0.04 K/uL  Auto Neutrophil % : 72.7 %  Auto Lymphocyte % : 10.2 %  Auto Monocyte % : 6.5 %  Auto Eosinophil % : 9.9 %  Auto Basophil % : 0.4 %      09-15    132<L>  |  103  |  14  ----------------------------<  78  4.4   |  17<L>  |  2.21<H>    Ca    8.6      15 Sep 2021 08:54  Mg     2.3     09-15    TPro  6.2  /  Alb  3.0<L>  /  TBili  0.2  /  DBili  x   /  AST  9<L>  /  ALT  <5<L>  /  AlkPhos  70  09-15      Urinalysis Basic - ( 13 Sep 2021 21:48 )    Color: Yellow / Appearance: Cloudy / SG: >=1.030 / pH: x  Gluc: x / Ketone: NEGATIVE  / Bili: Negative / Urobili: 0.2 E.U./dL   Blood: x / Protein: 100 mg/dL / Nitrite: NEGATIVE   Leuk Esterase: Small / RBC: 5-10 /HPF / WBC Many /HPF   Sq Epi: x / Non Sq Epi: 5-10 /HPF / Bacteria: Many /HPF          Radiology:    < from: Xray Chest 1 View- PORTABLE-Urgent (Xray Chest 1 View- PORTABLE-Urgent .) (09.13.21 @ 20:05) >  EXAM:  XR CHEST PORTABLE URGENT 1V                          PROCEDURE DATE:  09/13/2021          INTERPRETATION:  TECHNIQUE: Single portable view of the chest.    COMPARISON:  None    CLINICAL HISTORY: weakness    FINDINGS:    Single frontal view of the chest demonstrates the lungs to be clear. The cardiomediastinal silhouette is enlarged. Cervical spinal fusion hardware is partially visualized.. No acute osseous abnormalities. No pneumonia.    IMPRESSION: No acute cardiopulmonary disease process. Cardiomegaly.                    Vital Signs Last 24 Hrs  T(C): 36.4 (15 Sep 2021 08:44), Max: 36.6 (14 Sep 2021 17:34)  T(F): 97.6 (15 Sep 2021 08:44), Max: 97.8 (14 Sep 2021 17:34)  HR: 77 (15 Sep 2021 08:44) (70 - 77)  BP: 126/69 (15 Sep 2021 08:44) (115/58 - 127/66)  BP(mean): --  RR: 18 (15 Sep 2021 08:44) (17 - 18)  SpO2: 99% (15 Sep 2021 08:44) (99% - 100%)        REVIEW OF SYSTEMS: as per HPI          Physical Exam:  85 yo  gentleman lying in bed, NAD    Head: normocephalic, atraumatic    Eyes: PERRLA, EOMI, no nystagmus, sclera anicteric    ENT: nasal discharge, uvula midline, no oropharyngeal erythema/exudate    Neck: supple, negative JVD, negative carotid bruits, no thyromegaly    Chest: CTA bilaterally, neg wheeze/ rhonchi/ rales/ crackles/ egophany    Cardiovascular: regular rate and rhythm, neg murmurs/rubs/gallops    Abdomen: soft, non distended, non tender to palpation in all 4 quadrants, negative rebound/guarding, normal bowel sounds    Extremities: WWP, neg cyanosis/clubbing/edema, negative calf tenderness to palpation, negative William's sign    Neurologic Exam:    Alert and oriented to person, speech fluent w/o dysarthria, follows commands,     Cranial Nerves:     II:                         pupils equal, round and reactive to light, visual fields intact   III/ IV/VI:             extraocular movements intact, neg nystagmus, neg ptosis  V:                        facial sensation intact, V1-3 normal  VII:                      face symmetric, no droop, normal eye closure and smile  VIII:                     hearing intact to finger rub bilaterally  IX and X:             no hoarseness, gag intact, palate/ uvula rise symmetrically  XI:                       SCM/ trapezius strength intact bilateral  XII:                      no tongue deviation    Motor Exam:    Right UE:           no focal weakness > 3+/5    Left UE:              no focal weakness > 3+/5      Right LE:           no focal weakness > 3+/5    Left LE:              no focal weakness > 3+/5               Sensation:           intact to light touch x 4 extremities                                                   DTR:                  biceps/brachioradialis: equal bilaterally                                                     patella/ankle: equal bilaterally                                                      neg clonus                           neg Babinski                          Gait:  not tested        PM&R Impression:    1) deconditioned  2) no focal weakness    Recommendations/ Plan :    1) Physical therapy focusing on therapeutic exercises, bed mobility/transfer out of bed evaluation, progressive ambulation with assistive devices prn.    2) Anticipated Disposition Plan/Recs:    Palliative consulted for hospice                  
HPI:  Pt is an 85yo [place] speaking male PMH dementia (AAOx1 at baseline, bedbound), BPH with chronic yun (last exchanged 1.5 weeks ago by VNS), recurrent UTIs, DM2, CKD stage 4, neuropathy, chronic hyponatremia, cervical spine dz w/ chronic pain presenting for decreased po intake. History obtained from pt's son Fer Perez at bedside. Pt had decreased po intake for the past 3 days. Otherwise denies fevers, chills, HA, chest pain, sob, nausea, vomiting, abdominal pain, diarrhea, constipation, dysuria. Pt has been hospitalized at [facility] 3-4x this yr for recurrent UTIs, most recently 3 weeks ago. His son is concerned that his symptoms are related to progressive dementia rather than another UTI. Last month he spoke to the PMD Dr. Levi ([facility] house call physician) who discussed hospice with the son.  Pt currently lives with his son Devon who helps take care of pt. He has a HHA for 10hrs 7 days per week. He is interested in obtaining more care for his father and speaking further about hospice.     ED Course   VS: 98.6, 66, 96/57->112/61, 20, 100%  Labs: hgb 8.6, hct 26.1, eosinophils 600, Na+ 129, HCO3- 17, Cr 2.41, Glu 117, Trops 0.08, UA- positive   Imaging: CXR- clear, EKG- nsr with 1st deg av block   Received: 1L NS      (13 Sep 2021 23:14)    PERTINENT PM/SXH:   HTN (hypertension)    Diabetes mellitus    CKD (chronic kidney disease)    History of BPH    Indwelling Yun catheter present    Hyponatremia    Dementia      H/O Spinal surgery      FAMILY HISTORY:  No pertinent family history in first degree relatives      ITEMS NOT CHECKED ARE NOT PRESENT    SOCIAL HISTORY:   Significant other/partner:  [ ]  Children:  [x ]  Judaism/Spirituality: Anabaptism  Substance hx:  [ ]   Tobacco hx:  [x ]   Alcohol hx: [ ]   Home Opioid hx:  [ ] I-Stop Reference No:  Living Situation: [ x]Home  [ ]Long term care  [ ]Rehab [ ]Other    ADVANCE DIRECTIVES:    DNR  MOLST  [ ]  Living Will  [ ]   DECISION MAKER(s):  [ ] Health Care Proxy(s)  [x ] Surrogate(s)  [ ] Guardian           Name(s): Phone Number(s): Fer Dejesus (son)    BASELINE (I)ADL(s) (prior to admission):  [place]: [ ]Total  [ ] Moderate [x ]Dependent    Allergies    penicillins (Unknown)    Intolerances    MEDICATIONS  (STANDING):  carvedilol 12.5 milliGRAM(s) Oral every 12 hours  enoxaparin Injectable 30 milliGRAM(s) SubCutaneous every 24 hours  finasteride 5 milliGRAM(s) Oral daily  NIFEdipine XL 30 milliGRAM(s) Oral daily  pantoprazole    Tablet 40 milliGRAM(s) Oral before breakfast  simvastatin 10 milliGRAM(s) Oral at bedtime  sodium chloride 1 Gram(s) Oral every 12 hours  tamsulosin 0.4 milliGRAM(s) Oral every 12 hours  vancomycin    Solution 125 milliGRAM(s) Oral every 6 hours    MEDICATIONS  (PRN):  acetaminophen   Tablet .. 650 milliGRAM(s) Oral every 6 hours PRN Temp greater or equal to 38C (100.4F), Mild Pain (1 - 3), Moderate Pain (4 - 6)    PRESENT SYMPTOMS: [ x]Unable to obtain due to poor mentation   Source if other than patient:  [ ]Family   [ ]Team     Pain (Impact on QOL):  None  Location -         Minimal acceptable level (0-10 scale):                    Aggravating factors -  Quality -  Radiation -  Severity (0-10 scale) -    Timing -    PAIN AD Score:     [email or web address] (press ctrl +  left click to view)    Dyspnea:                           [ ]Mild [ ]Moderate [ ]Severe  Anxiety:                             [ ]Mild [ ]Moderate [ ]Severe  Fatigue:                             [ ]Mild [ ]Moderate [ ]Severe  Nausea:                             [ ]Mild [ ]Moderate [ ]Severe  Loss of appetite:              [ ]Mild [ ]Moderate [ ]Severe  Constipation:                    [ ]Mild [ ]Moderate [ ]Severe  Grief Present                    [ ] Yes   [ ] No   Other Symptoms:  [x ]All other review of systems negative     Karnofsky Performance Score/Palliative Performance Status Version 2: 30-40         %    http://palliative.info/resource_material/[email or web address].pdf  PHYSICAL EXAM:  Vital Signs Last 24 Hrs  T(C): 36.4 (15 Sep 2021 20:55), Max: 36.7 (15 Sep 2021 16:29)  T(F): 97.6 (15 Sep 2021 20:55), Max: 98 (15 Sep 2021 16:29)  HR: 66 (15 Sep 2021 20:55) (66 - 77)  BP: 127/65 (15 Sep 2021 20:55) (121/63 - 127/66)  BP(mean): --  RR: 18 (15 Sep 2021 20:55) (17 - 18)  SpO2: 99% (15 Sep 2021 20:55) (99% - 100%) I&O's Summary    14 Sep 2021 07:01  -  15 Sep 2021 07:00  --------------------------------------------------------  IN: 50 mL / OUT: 0 mL / NET: 50 mL    15 Sep 2021 07:01  -  15 Sep 2021 23:21  --------------------------------------------------------  IN: 0 mL / OUT: 300 mL / NET: -300 mL    GENERAL:  [x ]Alert  [x ]Oriented x 1  [ ]Lethargic  [ ]Cachexia  [ ]Unarousable  [ ]Verbal  [ ]Non-Verbal  Behavioral:   [ ] Anxiety  [ ] Delirium [ ] Agitation [ x] Other  HEENT:  [ ]Normal   [x ]Dry mouth   [ ]ET Tube/Trach  [ ]Oral lesions  PULMONARY:   [x ]Clear [ ]Tachypnea  [ ]Audible excessive secretions   [ ]Rhonchi        [ ]Right [ ]Left [ ]Bilateral  [ ]Crackles        [ ]Right [ ]Left [ ]Bilateral  [ ]Wheezing     [ ]Right [ ]Left [ ]Bilateral  CARDIOVASCULAR:    [x ]Regular [ ]Irregular [ ]Tachy  [ ]Jonathan [ ]Murmur [ ]Other  GASTROINTESTINAL:  [x ]Soft  [ ]Distended   [x ]+BS  [x]Non tender [ ]Tender  [ ]PEG [ ]OGT/ NGT  Last BM: GENITOURINARY:  [ ]Normal [ ] Incontinent   [ ]Oliguria/Anuria   [x ]Yun  MUSCULOSKELETAL:   [ ]Normal   [ ]Weakness  [x ]Bed/Wheelchair bound [ ]Edema  NEUROLOGIC:   [ ]No focal deficits  [x ] Cognitive impairment  [ ] Dysphagia [ ]Dysarthria [ ] Paresis [ ]Other   SKIN:   [x ]Normal   [ ]Pressure ulcer(s)  [ ]Rash    CRITICAL CARE:  [ ] Shock Present  [ ]Septic [ ]Cardiogenic [ ]Neurologic [ ]Hypovolemic  [ ]  Vasopressors [ ]  Inotropes   [ ] Respiratory failure present  [ ] Acute  [ ] Chronic [ ] Hypoxic  [ ] Hypercarbic [ ] Other  [ ] Other organ failure     LABS:                        8.7    9.29  )-----------( 365      ( 15 Sep 2021 08:54 )             26.1   09-15    132<L>  |  103  |  14  ----------------------------<  78  4.4   |  17<L>  |  2.21<H>    Ca    8.6      15 Sep 2021 08:54  Mg     2.3     09-15    TPro  6.2  /  Alb  3.0<L>  /  TBili  0.2  /  DBili  x   /  AST  9<L>  /  ALT  <5<L>  /  AlkPhos  70  09-15        RADIOLOGY & ADDITIONAL STUDIES:    PROTEIN CALORIE MALNUTRITION PRESENT: [ ] Yes [ ] No  [ ] PPSV2 < or = to 30% [ ] significant weight loss  [ ] poor nutritional intake [ ] catabolic state [ ] anasarca     Artificial Nutrition [ ]     REFERRALS:   [ ]Chaplaincy  [ ] Hospice  [ ]Child Life  [ ]Social Work  [ ]Case management [ ]Holistic Therapy   Goals of Care Discussion Document:

## 2021-09-15 NOTE — PROGRESS NOTE ADULT - PROBLEM SELECTOR PLAN 1
reportedly tested positive recently as outpatient, per collateral obtained from PCP by housestaff; will start PO vanc (day #1/10) and contact precautions; cont. serial abdominal exams, diet as tolerated; Epidemiology notified, will try to obtain records

## 2021-09-15 NOTE — PROGRESS NOTE ADULT - SUBJECTIVE AND OBJECTIVE BOX
INTERVAL HPI/OVERNIGHT EVENTS: Pt was started on pureed diet yesterday 9/14. He had two episodes of clear vomit    SUBJECTIVE: Patient seen and examined at bedside. He is not complaining of any pain. Is alert and oriented x1 only to name.    He denies any fevers, chills, nausea, SOB, CP.      VITAL SIGNS:  ICU Vital Signs Last 24 Hrs  T(C): 36.4 (15 Sep 2021 08:44), Max: 36.6 (14 Sep 2021 17:34)  T(F): 97.6 (15 Sep 2021 08:44), Max: 97.8 (14 Sep 2021 17:34)  HR: 77 (15 Sep 2021 08:44) (70 - 77)  BP: 126/69 (15 Sep 2021 08:44) (115/58 - 127/66)  RR: 18 (15 Sep 2021 08:44) (17 - 18)  SpO2: 99% (15 Sep 2021 08:44) (99% - 100%)      09-14 @ 07:01  -  09-15 @ 07:00  --------------------------------------------------------  IN: 50 mL / OUT: 0 mL / NET: 50 mL    09-15 @ 07:01  -  09-15 @ 11:04  --------------------------------------------------------  IN: 0 mL / OUT: 300 mL / NET: -300 mL        PHYSICAL EXAM:    General: NAD, frail elderly man lying comfortably in bed  HEENT: NC/AT; clear conjunctiva, MMM  Neck: supple, no adenopathy  Respiratory: CTA b/l  Cardiovascular: +S1/S2; RRR  Abdomen: soft, NT/ND; +BS x4  Extremities:  2+ peripheral pulses b/l; no LE edema  Skin: normal color and turgor; no rash  Neurological: A&Ox1. no FNDs. 5/5 in extremities.    MEDICATIONS:  MEDICATIONS  (STANDING):  carvedilol 12.5 milliGRAM(s) Oral every 12 hours  cefTRIAXone   IVPB 1000 milliGRAM(s) IV Intermittent every 24 hours  enoxaparin Injectable 30 milliGRAM(s) SubCutaneous every 24 hours  finasteride 5 milliGRAM(s) Oral daily  NIFEdipine XL 30 milliGRAM(s) Oral daily  pantoprazole    Tablet 40 milliGRAM(s) Oral before breakfast  polyethylene glycol 3350 17 Gram(s) Oral every 24 hours  simvastatin 10 milliGRAM(s) Oral at bedtime  sodium chloride 1 Gram(s) Oral every 12 hours  tamsulosin 0.4 milliGRAM(s) Oral every 12 hours    MEDICATIONS  (PRN):  acetaminophen   Tablet .. 650 milliGRAM(s) Oral every 6 hours PRN Temp greater or equal to 38C (100.4F), Mild Pain (1 - 3), Moderate Pain (4 - 6)      ALLERGIES:  Allergies    penicillins (Unknown)        LABS:                        8.7    9.29  )-----------( 365      ( 15 Sep 2021 08:54 )             26.1     09-15    132<L>  |  103  |  14  ----------------------------<  78  4.4   |  17<L>  |  2.21<H>    Ca    8.6      15 Sep 2021 08:54  Mg     2.3     09-15    TPro  6.2  /  Alb  3.0<L>  /  TBili  0.2  /  DBili  x   /  AST  9<L>  /  ALT  <5<L>  /  AlkPhos  70  09-15      Urinalysis Basic - ( 13 Sep 2021 21:48 )    Color: Yellow / Appearance: Cloudy / SG: >=1.030 / pH: x  Gluc: x / Ketone: NEGATIVE  / Bili: Negative / Urobili: 0.2 E.U./dL   Blood: x / Protein: 100 mg/dL / Nitrite: NEGATIVE   Leuk Esterase: Small / RBC: 5-10 /HPF / WBC Many /HPF   Sq Epi: x / Non Sq Epi: 5-10 /HPF / Bacteria: Many /HPF       INTERVAL HPI/OVERNIGHT EVENTS: Pt was started on pureed diet yesterday 9/14. He had two episodes of clear vomit    SUBJECTIVE: Patient seen and examined at bedside. He is not complaining of any pain. Is alert and oriented x1 only to name. Pt's son reports the patient has had diarrhea for a few weeks. Pt has not had diarrhea in the hospital.    He denies any fevers, chills, nausea, SOB, CP, or abdominal pain.      VITAL SIGNS:  ICU Vital Signs Last 24 Hrs  T(C): 36.4 (15 Sep 2021 08:44), Max: 36.6 (14 Sep 2021 17:34)  T(F): 97.6 (15 Sep 2021 08:44), Max: 97.8 (14 Sep 2021 17:34)  HR: 77 (15 Sep 2021 08:44) (70 - 77)  BP: 126/69 (15 Sep 2021 08:44) (115/58 - 127/66)  RR: 18 (15 Sep 2021 08:44) (17 - 18)  SpO2: 99% (15 Sep 2021 08:44) (99% - 100%)      09-14 @ 07:01  -  09-15 @ 07:00  --------------------------------------------------------  IN: 50 mL / OUT: 0 mL / NET: 50 mL    09-15 @ 07:01  -  09-15 @ 11:04  --------------------------------------------------------  IN: 0 mL / OUT: 300 mL / NET: -300 mL        PHYSICAL EXAM:    General: NAD, frail elderly man lying comfortably in bed  HEENT: NC/AT; clear conjunctiva, MMM  Neck: supple, no adenopathy  Respiratory: CTA b/l  Cardiovascular: +S1/S2; RRR  Abdomen: soft, NT/ND; +BS x4  Extremities:  2+ peripheral pulses b/l; no LE edema  Skin: normal color and turgor; no rash  Neurological: A&Ox1. no FNDs. 5/5 in extremities.    MEDICATIONS:  MEDICATIONS  (STANDING):  carvedilol 12.5 milliGRAM(s) Oral every 12 hours  cefTRIAXone   IVPB 1000 milliGRAM(s) IV Intermittent every 24 hours  enoxaparin Injectable 30 milliGRAM(s) SubCutaneous every 24 hours  finasteride 5 milliGRAM(s) Oral daily  NIFEdipine XL 30 milliGRAM(s) Oral daily  pantoprazole    Tablet 40 milliGRAM(s) Oral before breakfast  polyethylene glycol 3350 17 Gram(s) Oral every 24 hours  simvastatin 10 milliGRAM(s) Oral at bedtime  sodium chloride 1 Gram(s) Oral every 12 hours  tamsulosin 0.4 milliGRAM(s) Oral every 12 hours    MEDICATIONS  (PRN):  acetaminophen   Tablet .. 650 milliGRAM(s) Oral every 6 hours PRN Temp greater or equal to 38C (100.4F), Mild Pain (1 - 3), Moderate Pain (4 - 6)      ALLERGIES:  Allergies    penicillins (Unknown)        LABS:                        8.7    9.29  )-----------( 365      ( 15 Sep 2021 08:54 )             26.1     09-15    132<L>  |  103  |  14  ----------------------------<  78  4.4   |  17<L>  |  2.21<H>    Ca    8.6      15 Sep 2021 08:54  Mg     2.3     09-15    TPro  6.2  /  Alb  3.0<L>  /  TBili  0.2  /  DBili  x   /  AST  9<L>  /  ALT  <5<L>  /  AlkPhos  70  09-15      Urinalysis Basic - ( 13 Sep 2021 21:48 )    Color: Yellow / Appearance: Cloudy / SG: >=1.030 / pH: x  Gluc: x / Ketone: NEGATIVE  / Bili: Negative / Urobili: 0.2 E.U./dL   Blood: x / Protein: 100 mg/dL / Nitrite: NEGATIVE   Leuk Esterase: Small / RBC: 5-10 /HPF / WBC Many /HPF   Sq Epi: x / Non Sq Epi: 5-10 /HPF / Bacteria: Many /HPF

## 2021-09-15 NOTE — PROGRESS NOTE ADULT - PROBLEM SELECTOR PLAN 4
- Patient with advanced Alzheimer's dementia, FTT, frequent readmissions.  - DNR/DNI.  - MOLST in chart.  - Please refer to ACP below.

## 2021-09-15 NOTE — DISCHARGE NOTE PROVIDER - NSDCCPCAREPLAN_GEN_ALL_CORE_FT
PRINCIPAL DISCHARGE DIAGNOSIS  Diagnosis: Failure to thrive in adult  Assessment and Plan of Treatment: You presented to the hospital with 3 days of decreased intake of food and water and likely for longer. We held you without any oral intake until speech and swallow were consulted to make sure you were not at risk of poor swallowing or choking. After, we started you on thin pureed and thin liquid diet. You have been able to tolerate the diet, but if you find yourself unable to eat for several days report back to nearest hospital.      SECONDARY DISCHARGE DIAGNOSES  Diagnosis: Asymptomatic bacteriuria  Assessment and Plan of Treatment: On presentation we found that your urine test showed bacteria. You did not have any symptoms of an infection. However, you have a history of urinary tract infections so we started treating you with antibiotics. We did not finish the course because we started a different antibiotic when we found out you were positive for clostridium difficile. If you experience any symptoms such as pain on urination, inability to pee, fever, nausea, vomiting, chills, return to nearest hospital.    Diagnosis: Clostridium difficile colitis  Assessment and Plan of Treatment: You were having diarrhea so your PCP tested you for clostridium difficile. We found out you were positive in the hospital so we put you on precaution isolation. We also switched your antibiotic to cover for this bacteria. You were not having any diarrhea or signs of a systemic infection such as fever. If you have symptoms such as but not limited to fever, abdominal pain, nausea, vomiting, diarrhea, please return to nearest hospital.    Diagnosis: Hypertension  Assessment and Plan of Treatment: You have a history of high blood pressure and take three medications for it nifedipine, coreg, and candesartan. We held candesartan when you presented because your blood pressure was low. If you have any symptoms such as, but not limited to, headache or muscle weakness, present to nearest hospital.    Diagnosis: Hyponatremia  Assessment and Plan of Treatment: you have low sodium at baseline usually. We treated you for it with your home meds of 1g sodium chloride table twice a day. If you are feeling drowsy or weak present to nearest urgent care/hospital.

## 2021-09-15 NOTE — PROGRESS NOTE ADULT - PROBLEM SELECTOR PLAN 11
F: none  E: replete K <4, Mg <2  N: NPO   GI Ppx: Protonix   DVT Ppx: Lovenox   Code status: FULL   Dispo: [facility] Per HIE, Baseline Cr 2.3-2.7. Pt currently at baseline with Cr 2.4   - Monitor BMP

## 2021-09-15 NOTE — DISCHARGE NOTE PROVIDER - CARE PROVIDER_API CALL
JAMIL GHOTRA  Internal Medicine  [address]  [place] [address]  Phone: [phone]  Fax: [phone]  Established Patient  Follow Up Time: 2 weeks

## 2021-09-15 NOTE — PROGRESS NOTE ADULT - PROBLEM SELECTOR PLAN 1
Presenting with 3 days of decrease po intake. ROS neg. UA positive, in setting of chronic yun (last replaced 1.5 weeks ago), although contaminated. CXR clear, lactate wnl, appears non-toxic. FTT likely 2/2 UTI vs progressive dementia. Pt's son spoke with PMD 1 month ago about hospice as pt is hospice eligible per paperwork brought in from PMD Dr. Maggi Levi.   - Ceftriaxone 1g qd x 3 days for now  - F/u palliative consult  - Speech and swallow rec advance to pureed and thin liquids  - F/u PT consult Presenting with 3 days of decrease po intake. ROS neg. UA positive, in setting of chronic yun, although contaminated. CXR clear, lactate wnl, appears non-toxic. FTT likely 2/2  progressive dementia. Pt's son spoke with PMD 1 month ago about hospice as pt is hospice eligible per paperwork brought in from PMD Dr. Maggi Levi.   - F/u palliative consult  - Speech and swallow rec advance to pureed and thin liquids

## 2021-09-15 NOTE — PROGRESS NOTE ADULT - PROBLEM SELECTOR PLAN 2
Lida Kruger RN              H/o recurrent UTIs. Has been hospitalized 3-4x this yr for UTI. Last admission at [facility] 3 weeks ago. During that admission he received IV Abx and was sent home with 1 more day of po Abx. UA positive, although contaminated, likely in setting of chronic yun.   - Ceftriazone 1g qd x 3 days  - Ucx grows >100k CFU/ml E coli  - Yun catheter removed and replaced 9/14 by Urology PA H/o recurrent UTIs. Has been hospitalized 3-4x this yr for UTI. Last admission at [facility] 3 weeks ago. During that admission he received IV Abx and was sent home with 1 more day of po Abx. UA positive, although contaminated, likely in setting of chronic yun. Patient asymptomatic denying any dysuria, systemic symptoms.  - Ceftriazone discontinued  - Ucx grows >100k CFU/ml E coli  - Yun catheter removed and replaced 9/14 by Urology PA H/o recurrent UTIs. Has been hospitalized 3-4x this yr for UTI. Last admission at [facility] 3 weeks ago. During that admission he received IV Abx and was sent home with 1 more day of po Abx. UA positive, although contaminated, likely in setting of chronic yun. Patient asymptomatic denying any dysuria, systemic symptoms.  - Ceftriaxone discontinued  - Ucx grows >100k CFU/ml E coli  - Yun catheter removed and replaced 9/14 by Urology PA

## 2021-09-15 NOTE — DISCHARGE NOTE PROVIDER - DETAILS OF MALNUTRITION DIAGNOSIS/DIAGNOSES
This patient has been assessed with a concern for Malnutrition and was treated during this hospitalization for the following Nutrition diagnosis/diagnoses:     -  09/14/2021: Severe protein-calorie malnutrition

## 2021-09-15 NOTE — PROGRESS NOTE ADULT - PROBLEM SELECTOR PLAN 3
assist w/ ADLs, provide frequent re-orientation; dysphagia diet, per SLP recs; Palliative Care consulted

## 2021-09-15 NOTE — DISCHARGE NOTE PROVIDER - HOSPITAL COURSE
#Discharge: do not delete    Patient is 85 yo M with past medical history of dementia (AAOx1 at baseline, bedbound), BPH with chronic yun, recurrent UTIs, DM2, CKD stage 4, neuropathy, chronic hyponatremia, cervical spine dz w/ chronic pain presented with three days of decreased PO intake likely 2/2 progressive dementia.    Hospital course (by problem):     Problem/Plan - 1: Failure to thrive in adult.   Patient presented with 3 days of decrease po intake. Speech and swallow consulted and started patient on pureed and thin liquid diet. Pt's son met with palliative care doctor and decided to have home hospice implemented.     Problem/Plan - 2:  Asymptomatic bacteriuria.   Pt presented with positive UA in the setting of chronic yun but was asymptomatic for UTI. Pt has Hx of  recurrent UTIs. Has been hospitalized 3-4x this yr for UTI. Last admission at [facility] 3 weeks ago. Urine cultures grew E coli. Yun catheter was removed and replaced. Patient was treated with ceftriaxone 1000mg qd for one day which was then discontinued.     Problem/Plan - 3: Clostridium difficile colitis.   Patient was having diarrhea before admission but not while on floors. While on floors PCP reported a positive C diff test one month ago. Pt was asymptomatic with a WBC wnl and no fever. Patient was started on precaution isolations. Ceftriaxone was discontinued. Vanc PO 10 day course was started at 125 mg q6.      Problem/Plan - 4: Dementia.   Pt was alert and orientedx1 only to name on presentation which is baseline according to son. Palliative care consulted and decided with patient son to seek home hospice.     Problem/Plan - 5: Anemia    Hgb 8.6. Per HIE appears baseline is 7-8. There was no sign/symptoms of bleeding.        Problem/Plan - 6: BPH (benign prostatic hyperplasia).   Home meds Flomax 0.4mg BID and Finasteride 5mg were continued. Yun was exchanged 9/14 by Urology PA.     Problem/Plan - 7: Hypertension.   Home meds Carvedilol 12.5mg BID, Nifedipine 30mg qd were continued. Home med candesartan 4 mg was held due to patient presenting with BP of 96/55.       Problem/Plan - 8: Type 2 diabetes mellitus.   Patient was not on any home medication. Per HIE, glu on bmp is usually . Monitored Finger sticks. HgbA1c was 4.9       Problem/Plan - 9: Hyponatremia  Per HIE, patient's baseline Na+ 127-131. Patient presented with sodium of 129. Sodium chloride tabs (home med) given 1g BID.        Problem/Plan - 10: Chronic pain.   Patient given tylenol PRN for pain.     Problem/Plan - 11: Stage 4 chronic kidney disease.   Per HIE, Baseline Cr 2.3-2.7. Pt presented with Cr of 2.4. BMP was monitored.      Patient was discharged to: (home/[facility]/acute rehab/hospice, etc, and with what services – home health PT/RN? Home O2?)    New medications:   Vancomycin  mg q6    Changes to old medications:    Medications that were stopped:  Candesartan    Items to follow up as outpatient:    Physical exam at the time of discharge:       #Discharge: do not delete    Patient is 85 yo M with past medical history of dementia (AAOx1 at baseline, bedbound), BPH with chronic yun, recurrent UTIs, DM2, CKD stage 4, neuropathy, chronic hyponatremia, cervical spine dz w/ chronic pain presented with three days of decreased PO intake likely 2/2 progressive dementia.    Hospital course (by problem):     Problem/Plan - 1: Failure to thrive in adult.   Patient presented with 3 days of decrease po intake. Speech and swallow consulted and started patient on pureed and thin liquid diet. Pt's son met with palliative care doctor and decided to have home hospice implemented.     Problem/Plan - 2:  Asymptomatic bacteriuria.   Pt presented with positive UA in the setting of chronic yun but was asymptomatic for UTI. Pt has Hx of  recurrent UTIs. Has been hospitalized 3-4x this yr for UTI. Last admission at [facility] 3 weeks ago. Urine cultures grew E coli. Yun catheter was removed and replaced. Patient was treated with ceftriaxone 1000mg qd for one day which was then discontinued.     Problem/Plan - 3: Clostridium difficile colitis.   Patient was having diarrhea before admission but not while on floors. While on floors PCP reported a positive C diff test one month ago. Pt was asymptomatic with a WBC wnl and no fever. Patient was started on precaution isolations. Ceftriaxone was discontinued. Vanc PO 10 day course was started at 125 mg q6.      Problem/Plan - 4: Dementia.   Pt was alert and orientedx1 only to name on presentation which is baseline according to son. Palliative care consulted and decided with patient son to seek home hospice.     Problem/Plan - 5: Anemia    Hgb 8.6. Per HIE appears baseline is 7-8. There was no sign/symptoms of bleeding.        Problem/Plan - 6: BPH (benign prostatic hyperplasia).   Home meds Flomax 0.4mg BID and Finasteride 5mg were continued. Yun was exchanged 9/14 by Urology PA.     Problem/Plan - 7: Hypertension.   Home meds Carvedilol 12.5mg BID, Nifedipine 30mg qd were continued. Home med candesartan 4 mg was held due to patient presenting with BP of 96/55.       Problem/Plan - 8: Type 2 diabetes mellitus.   Patient was not on any home medication. Per HIE, glu on bmp is usually . Monitored Finger sticks. HgbA1c was 4.9       Problem/Plan - 9: Hyponatremia  Per HIE, patient's baseline Na+ 127-131. Patient presented with sodium of 129. Sodium chloride tabs (home med) given 1g BID.        Problem/Plan - 10: Chronic pain.   Patient given tylenol PRN for pain.     Problem/Plan - 11: Stage 4 chronic kidney disease.   Per HIE, Baseline Cr 2.3-2.7. Pt presented with Cr of 2.4. BMP was monitored.      Patient was discharged to: Home hospice    New medications:   Vancomycin  mg q6    Changes to old medications:  None    Medications that were stopped:  Candesartan    Items to follow up as outpatient:  None    Physical exam at the time of discharge:       #Discharge: do not delete    Patient is 85 yo M with past medical history of dementia (AAOx1 at baseline, bedbound), BPH with chronic yun, recurrent UTIs, DM2, CKD stage 4, neuropathy, chronic hyponatremia, cervical spine dz w/ chronic pain presented with three days of decreased PO intake likely 2/2 progressive dementia.    Hospital course (by problem):     Problem/Plan - 1: Failure to thrive in adult.   Patient presented with 3 days of decrease po intake. Speech and swallow consulted and started patient on pureed and thin liquid diet. Pt's son met with palliative care doctor and decided to have home hospice implemented.     Problem/Plan - 2:  Asymptomatic bacteriuria.   Pt presented with positive UA in the setting of chronic yun but was asymptomatic for UTI. Pt has Hx of  recurrent UTIs. Has been hospitalized 3-4x this yr for UTI. Last admission at [facility] 3 weeks ago. Urine cultures grew E coli. Yun catheter was removed and replaced. Patient was treated with ceftriaxone 1000mg qd for one day which was then discontinued.     Problem/Plan - 3: Clostridium difficile colitis.   Patient was having diarrhea before admission but not while on floors. While on floors PCP reported a positive C diff test one month ago. Pt was asymptomatic with a WBC wnl and no fever. Patient was started on precaution isolations. Ceftriaxone was discontinued. Vanc PO 10 day course was started at 125 mg q6.      Problem/Plan - 4: Dementia.   Pt was alert and orientedx1 only to name on presentation which is baseline according to son. Palliative care consulted and decided with patient son to seek home hospice.     Problem/Plan - 5: Anemia    Hgb 8.6. Per HIE appears baseline is 7-8. There was no sign/symptoms of bleeding.        Problem/Plan - 6: BPH (benign prostatic hyperplasia).   Home meds Flomax 0.4mg BID and Finasteride 5mg were continued. Yun was exchanged 9/14 by Urology PA.     Problem/Plan - 7: Hypertension.   Home meds Carvedilol 12.5mg BID, Nifedipine 30mg qd were continued. Home med candesartan 4 mg was held due to patient presenting with BP of 96/55.       Problem/Plan - 8: Type 2 diabetes mellitus.   Patient was not on any home medication. Per HIE, glu on bmp is usually . Monitored Finger sticks. HgbA1c was 4.9       Problem/Plan - 9: Hyponatremia  Per HIE, patient's baseline Na+ 127-131. Patient presented with sodium of 129. Sodium chloride tabs (home med) given 1g BID.        Problem/Plan - 10: Chronic pain.   Patient given tylenol PRN for pain.     Problem/Plan - 11: Stage 4 chronic kidney disease.   Per HIE, Baseline Cr 2.3-2.7. Pt presented with Cr of 2.4. BMP was monitored.      Patient was discharged to: Home hospice    New medications:   Vancomycin  mg q6    Changes to old medications:  None    Medications that were stopped:  Candesartan 4mg  polyethylene glycol 3350    Items to follow up as outpatient:  None    Physical exam at the time of discharge:    General: elderly male AAOx1 only to name  Patient refused physical exam.

## 2021-09-15 NOTE — PROGRESS NOTE ADULT - PROBLEM SELECTOR PLAN 5
- C/w home meds Flomax 0.4mg BID and Finasteride 5mg Hgb 8.6. Per HIE appears baseline is 7-8. No sign/symptoms of bleeding.   - Monitor CBC   - Maintain active type and screen  - Transfuse for Hgb <7

## 2021-09-15 NOTE — PROGRESS NOTE ADULT - ASSESSMENT
Pt is an 87yo Icelandic speaking male PMH dementia (AAOx1 at baseline, bedbound), BPH with chronic yun (last exchanged 2.5 weeks ago by VNS), recurrent UTIs, DM2, CKD stage 4, neuropathy, chronic hyponatremia, cervical spine dz w/ chronic pain presenting for decreased po intake. FTT in the setting of progressive dementia. Pt is an 85yo Latvian speaking male PMH dementia (AAOx1 at baseline, bedbound), BPH with chronic yun (last exchanged 2.5 weeks ago by VNS), recurrent UTIs, DM2, CKD stage 4, neuropathy, chronic hyponatremia, cervical spine dz w/ chronic pain presenting for decreased po intake. Patient positive for C diff, wbc wnl and no fever being treated with vanc PO. Will monitor. FTT likely in the setting of progressive dementia.

## 2021-09-15 NOTE — PROGRESS NOTE ADULT - PROBLEM SELECTOR PLAN 4
Hgb 8.6. Per HIE appears baseline is 7-8. No sign/symptoms of bleeding.   - Monitor CBC   - Maintain active type and screen  - Transfuse for Hgb <7 AAOx1 at baseline. Concern FTT is 2/2 progressive dementia. Pt is DNR/DNI (MOLST form scanned into alpha) and discussion had with family about hospice, as pt now hospice eligible. Paperwork from PMD says he has been progressively declining over past few months.   - Palliative consult as above   - Obtain collateral from PMD Dr. Maggi Levi

## 2021-09-16 ENCOUNTER — TRANSCRIPTION ENCOUNTER (OUTPATIENT)
Age: 86
End: 2021-09-16

## 2021-09-16 LAB
ALBUMIN SERPL ELPH-MCNC: 2.8 G/DL — LOW (ref 3.3–5)
ALP SERPL-CCNC: 74 U/L — SIGNIFICANT CHANGE UP (ref 40–120)
ALT FLD-CCNC: <5 U/L — LOW (ref 10–45)
ANION GAP SERPL CALC-SCNC: 9 MMOL/L — SIGNIFICANT CHANGE UP (ref 5–17)
AST SERPL-CCNC: 10 U/L — SIGNIFICANT CHANGE UP (ref 10–40)
BASOPHILS # BLD AUTO: 0.04 K/UL — SIGNIFICANT CHANGE UP (ref 0–0.2)
BASOPHILS NFR BLD AUTO: 0.4 % — SIGNIFICANT CHANGE UP (ref 0–2)
BILIRUB SERPL-MCNC: 0.2 MG/DL — SIGNIFICANT CHANGE UP (ref 0.2–1.2)
BUN SERPL-MCNC: 15 MG/DL — SIGNIFICANT CHANGE UP (ref 7–23)
CALCIUM SERPL-MCNC: 9 MG/DL — SIGNIFICANT CHANGE UP (ref 8.4–10.5)
CHLORIDE SERPL-SCNC: 103 MMOL/L — SIGNIFICANT CHANGE UP (ref 96–108)
CO2 SERPL-SCNC: 17 MMOL/L — LOW (ref 22–31)
CREAT SERPL-MCNC: 2.13 MG/DL — HIGH (ref 0.5–1.3)
EOSINOPHIL # BLD AUTO: 1.17 K/UL — HIGH (ref 0–0.5)
EOSINOPHIL NFR BLD AUTO: 12.6 % — HIGH (ref 0–6)
GLUCOSE SERPL-MCNC: 105 MG/DL — HIGH (ref 70–99)
HCT VFR BLD CALC: 25.8 % — LOW (ref 39–50)
HGB BLD-MCNC: 8.5 G/DL — LOW (ref 13–17)
IMM GRANULOCYTES NFR BLD AUTO: 0.4 % — SIGNIFICANT CHANGE UP (ref 0–1.5)
LYMPHOCYTES # BLD AUTO: 1.13 K/UL — SIGNIFICANT CHANGE UP (ref 1–3.3)
LYMPHOCYTES # BLD AUTO: 12.2 % — LOW (ref 13–44)
MCHC RBC-ENTMCNC: 30.1 PG — SIGNIFICANT CHANGE UP (ref 27–34)
MCHC RBC-ENTMCNC: 32.9 GM/DL — SIGNIFICANT CHANGE UP (ref 32–36)
MCV RBC AUTO: 91.5 FL — SIGNIFICANT CHANGE UP (ref 80–100)
MONOCYTES # BLD AUTO: 0.75 K/UL — SIGNIFICANT CHANGE UP (ref 0–0.9)
MONOCYTES NFR BLD AUTO: 8.1 % — SIGNIFICANT CHANGE UP (ref 2–14)
NEUTROPHILS # BLD AUTO: 6.16 K/UL — SIGNIFICANT CHANGE UP (ref 1.8–7.4)
NEUTROPHILS NFR BLD AUTO: 66.3 % — SIGNIFICANT CHANGE UP (ref 43–77)
NRBC # BLD: 0 /100 WBCS — SIGNIFICANT CHANGE UP (ref 0–0)
PLATELET # BLD AUTO: 347 K/UL — SIGNIFICANT CHANGE UP (ref 150–400)
POTASSIUM SERPL-MCNC: 4.6 MMOL/L — SIGNIFICANT CHANGE UP (ref 3.5–5.3)
POTASSIUM SERPL-SCNC: 4.6 MMOL/L — SIGNIFICANT CHANGE UP (ref 3.5–5.3)
PROT SERPL-MCNC: 6.5 G/DL — SIGNIFICANT CHANGE UP (ref 6–8.3)
RBC # BLD: 2.82 M/UL — LOW (ref 4.2–5.8)
RBC # FLD: 15.8 % — HIGH (ref 10.3–14.5)
SODIUM SERPL-SCNC: 129 MMOL/L — LOW (ref 135–145)
WBC # BLD: 9.29 K/UL — SIGNIFICANT CHANGE UP (ref 3.8–10.5)
WBC # FLD AUTO: 9.29 K/UL — SIGNIFICANT CHANGE UP (ref 3.8–10.5)

## 2021-09-16 PROCEDURE — 99233 SBSQ HOSP IP/OBS HIGH 50: CPT

## 2021-09-16 RX ADMIN — Medication 30 MILLIGRAM(S): at 06:34

## 2021-09-16 RX ADMIN — Medication 125 MILLIGRAM(S): at 12:02

## 2021-09-16 RX ADMIN — Medication 125 MILLIGRAM(S): at 00:33

## 2021-09-16 RX ADMIN — CARVEDILOL PHOSPHATE 12.5 MILLIGRAM(S): 80 CAPSULE, EXTENDED RELEASE ORAL at 19:16

## 2021-09-16 RX ADMIN — ENOXAPARIN SODIUM 30 MILLIGRAM(S): 100 INJECTION SUBCUTANEOUS at 06:35

## 2021-09-16 RX ADMIN — SODIUM CHLORIDE 1 GRAM(S): 9 INJECTION INTRAMUSCULAR; INTRAVENOUS; SUBCUTANEOUS at 06:34

## 2021-09-16 RX ADMIN — SODIUM CHLORIDE 1 GRAM(S): 9 INJECTION INTRAMUSCULAR; INTRAVENOUS; SUBCUTANEOUS at 19:18

## 2021-09-16 RX ADMIN — PANTOPRAZOLE SODIUM 40 MILLIGRAM(S): 20 TABLET, DELAYED RELEASE ORAL at 06:35

## 2021-09-16 RX ADMIN — CARVEDILOL PHOSPHATE 12.5 MILLIGRAM(S): 80 CAPSULE, EXTENDED RELEASE ORAL at 06:35

## 2021-09-16 RX ADMIN — Medication 125 MILLIGRAM(S): at 19:18

## 2021-09-16 RX ADMIN — Medication 125 MILLIGRAM(S): at 06:34

## 2021-09-16 RX ADMIN — TAMSULOSIN HYDROCHLORIDE 0.4 MILLIGRAM(S): 0.4 CAPSULE ORAL at 06:34

## 2021-09-16 RX ADMIN — SIMVASTATIN 10 MILLIGRAM(S): 20 TABLET, FILM COATED ORAL at 00:34

## 2021-09-16 RX ADMIN — FINASTERIDE 5 MILLIGRAM(S): 5 TABLET, FILM COATED ORAL at 12:02

## 2021-09-16 NOTE — DISCHARGE NOTE NURSING/CASE MANAGEMENT/SOCIAL WORK - NSDCCRNAME_GEN_ALL_CORE_FT
VNS Choice (**VNS Choice: please expedite evaluation for increase in HA hours to 24hr x 7d. Pt is bed-confined and dependent for all care**.

## 2021-09-16 NOTE — PROGRESS NOTE ADULT - ASSESSMENT
Pt is an 87yo Indonesian speaking male PMH dementia (AAOx1 at baseline, bedbound), BPH with chronic yun (last exchanged 2.5 weeks ago by VNS), recurrent UTIs, DM2, CKD stage 4, neuropathy, chronic hyponatremia, cervical spine dz w/ chronic pain presenting for decreased po intake. Patient positive for C diff, wbc wnl and no fever being treated with vanc PO. Will monitor. FTT likely in the setting of progressive dementia.

## 2021-09-16 NOTE — PROGRESS NOTE ADULT - PROBLEM SELECTOR PLAN 2
H/o recurrent UTIs. Has been hospitalized 3-4x this yr for UTI. Last admission at [facility] 3 weeks ago. During that admission he received IV Abx and was sent home with 1 more day of po Abx. UA positive, although contaminated, likely in setting of chronic yun. Patient asymptomatic denying any dysuria, systemic symptoms.  - Ceftriaxone discontinued  - Ucx grows >100k CFU/ml E coli  - Yun catheter removed and replaced 9/14 by Urology PA

## 2021-09-16 NOTE — PROGRESS NOTE ADULT - PROBLEM SELECTOR PLAN 5
Hgb 8.6. Per HIE appears baseline is 7-8. No sign/symptoms of bleeding.   - Monitor CBC   - Maintain active type and screen  - Transfuse for Hgb <7

## 2021-09-16 NOTE — CHART NOTE - NSCHARTNOTEFT_GEN_A_CORE
Progress Note:   · Provider Specialty	Palliative Care    Reason for Admission:    Reason for Admission:  · Reason for Admission	FTT,UTI      · Subjective and Objective:   SUBJECTIVE AND OBJECTIVE:  No complaints  Fatigue  INTERVAL HPI/OVERNIGHT EVENTS:  Remains on antibiotics.  Minimally verbal.   Son at bedside.   DNR on chart:   Allergies    penicillins (Unknown)    Intolerances    MEDICATIONS  (STANDING):  carvedilol 12.5 milliGRAM(s) Oral every 12 hours  enoxaparin Injectable 30 milliGRAM(s) SubCutaneous every 24 hours  finasteride 5 milliGRAM(s) Oral daily  NIFEdipine XL 30 milliGRAM(s) Oral daily  pantoprazole    Tablet 40 milliGRAM(s) Oral before breakfast  simvastatin 10 milliGRAM(s) Oral at bedtime  sodium chloride 1 Gram(s) Oral every 12 hours  tamsulosin 0.4 milliGRAM(s) Oral every 12 hours  vancomycin    Solution 125 milliGRAM(s) Oral every 6 hours    MEDICATIONS  (PRN):  acetaminophen   Tablet .. 650 milliGRAM(s) Oral every 6 hours PRN Temp greater or equal to 38C (100.4F), Mild Pain (1 - 3), Moderate Pain (4 - 6)      ITEMS UNCHECKED ARE NOT PRESENT    PRESENT SYMPTOMS: [x ]Unable to obtain due to poor mentation   Source if other than patient:  [ ]Family   [ ]Team     Pain (Impact on QOL):    Location:  Minimal acceptable level (0-10 scale):                   Aggravating factors:  Quality:  Radiation:  Severity (0-10 scale):    Timing:    Dyspnea:                           [ ]Mild [ ]Moderate [ ]Severe  Anxiety:                             [ ]Mild [ ]Moderate [ ]Severe  Fatigue:                             [ ]Mild [ ]Moderate [ ]Severe  Nausea:                             [ ]Mild [ ]Moderate [ ]Severe  Loss of appetite:              [ ]Mild [ ]Moderate [ ]Severe  Constipation:                    [ ]Mild [ ]Moderate [ ]Severe  Grief Present                    [ ] Yes  [ ] No   PAIN AD Score:	  http://geriatrictoolkit.[place].[facility]/cog/painad.pdf (Ctrl + left click to view)    Other Symptoms:  [ x]All other review of systems negative     Karnofsky Performance Score/Palliative Performance Status Version 2:   30      %    http://palliative.info/resource_material/[email or web address].pdf  PHYSICAL EXAM:  Vital Signs Last 24 Hrs  T(C): 36.4 (15 Sep 2021 20:55), Max: 36.7 (15 Sep 2021 16:29)  T(F): 97.6 (15 Sep 2021 20:55), Max: 98 (15 Sep 2021 16:29)  HR: 66 (15 Sep 2021 20:55) (66 - 77)  BP: 127/65 (15 Sep 2021 20:55) (121/63 - 127/66)  BP(mean): --  RR: 18 (15 Sep 2021 20:55) (17 - 18)  SpO2: 99% (15 Sep 2021 20:55) (99% - 100%) I&O's Summary    14 Sep 2021 07:01  -  15 Sep 2021 07:00  --------------------------------------------------------  IN: 50 mL / OUT: 0 mL / NET: 50 mL    15 Sep 2021 07:01  -  15 Sep 2021 23:53  --------------------------------------------------------  IN: 0 mL / OUT: 300 mL / NET: -300 mL     GENERAL:  [ ]Alert  [ ]Oriented x   [x ]Lethargic  [x ]Cachexia  [ ]Unarousable  [ ]Verbal  [ ]Non-Verbal  Behavioral:   [ ] Anxiety  [ ] Delirium [ ] Agitation [x ] Other  HEENT:  [ ]Normal   [x ]Dry mouth   [ ]ET Tube/Trach  [ ]Oral lesions  PULMONARY:   [ x]Clear [ ]Tachypnea  [ ]Audible excessive secretions   [ ]Rhonchi        [ ]Right [ ]Left [ ]Bilateral  [ ]Crackles        [ ]Right [ ]Left [ ]Bilateral  [ ]Wheezing     [ ]Right [ ]Left [ ]Bilateral  CARDIOVASCULAR:    [ x]Regular [ ]Irregular [ ]Tachy  [ ]Jonathan [ ]Murmur [ ]Other  GASTROINTESTINAL:  [x ]Soft  [ ]Distended   [x ]+BS  [x ]Non tender [ ]Tender  [ ]PEG [ ]OGT/ NGT   Last BM:  GENITOURINARY:  [ ]Normal [ ] Incontinent   [ ]Oliguria/Anuria   [ x]Saleh  MUSCULOSKELETAL:   [ ]Normal   [ ]Weakness  [x ]Bed/Wheelchair bound [ ]Edema  NEUROLOGIC:   [ ]No focal deficits  [x ] Cognitive impairment  [ ] Dysphagia [ ]Dysarthria [ ] Paresis [ ]Other   SKIN:   [x ]Normal   [ ]Pressure ulcer(s)  [ ]Rash    CRITICAL CARE:  [ ] Shock Present  [ ]Septic [ ]Cardiogenic [ ]Neurologic [ ]Hypovolemic  [ ]  Vasopressors [ ]  Inotropes   [ ] Respiratory failure present  [ ] Acute  [ ] Chronic [ ] Hypoxic  [ ] Hypercarbic [ ] Other  [ ] Other organ failure     LABS:                        8.7    9.29  )-----------( 365      ( 15 Sep 2021 08:54 )             26.1   09-15    132<L>  |  103  |  14  ----------------------------<  78  4.4   |  17<L>  |  2.21<H>    Ca    8.6      15 Sep 2021 08:54  Mg     2.3     09-15    TPro  6.2  /  Alb  3.0<L>  /  TBili  0.2  /  DBili  x   /  AST  9<L>  /  ALT  <5<L>  /  AlkPhos  70  09-15        RADIOLOGY & ADDITIONAL STUDIES:    Protein Calorie Malnutrition Present: [ ] yes [ ] no  [ ] PPSV2 < or = 30%  [ ] significant weight loss [ ] poor nutritional intake [ ] anasarca [ ] catabolic state Artificial Nutrition [ ]     REFERRALS:   [ ]Chaplaincy  [ ] Hospice  [ ]Child Life  [ ]Social Work  [ ]Case management [ ]Holistic Therapy   Goals of Care Document:     Assessment and Plan:   · Assessment	  86 M with Alzheimer's dementia, FTT, debility, encounter for palliative care.      Problem/Plan - 1:  ·  Problem: Failure to thrive in adult.   ·  Plan: - Patient with evidence of progressive functional, cognitive and nutritional decline.  - Secondary to advanced dementia.  - Evidence of cachexia, anasarca.     Problem/Plan - 2:  ·  Problem: Dementia.   ·  Plan: - Patient with advanced Alzheimer's dementia.  - FAST >6D.  - Poor overall prognosis, especially given frequent readmissions for UTIs.  - Serum albumin 3.  - Prealbumin pending.     Problem/Plan - 3:  ·  Problem: Debility.   ·  Plan: - PPSv2 is 30%.  - Full assist with ADLs.  - Patient's son is sole caretaker.     Problem/Plan - 4:  ·  Problem: Encounter for palliative care.   ·  Plan: - Patient with advanced Alzheimer's dementia, FTT, frequent readmissions.  - DNR/DNI.  - MOLST in chart.
PALLIATIVE MEDICINE COORDINATION OF CARE NOTE FOR LOKESH PEREZ  [  ] ED Trigger   [  ] MICU Trigger     [ x ] Consult    Patient last assessed: __9/14/21___  to manage: GOC/AD, Symptoms, and Support was recommended:___9/14/21___    ___40___ Minutes; Start: _0920____  End: _1000___, of non-face-to-face prolonged service provided that relates to (face-to-face) care that has or will occur and ongoing patient management, including one or more of the following:   - Reviewed records from other physicians or other health care professional services, including one or more of the following: other medical records and diagnostic / radiology study results     HPI:  Pt is an 85yo Mohawk speaking male PMH dementia (AAOx1 at baseline, bedbound), BPH with chronic yun (last exchanged 1.5 weeks ago by VNS), recurrent UTIs, DM2, CKD stage 4, neuropathy, chronic hyponatremia, cervical spine dz w/ chronic pain presenting for decreased po intake. History obtained from pt's son Fer Perez at bedside. Pt had decreased po intake for the past 3 days. Otherwise denies fevers, chills, HA, chest pain, sob, nausea, vomiting, abdominal pain, diarrhea, constipation, dysuria. Pt has been hospitalized at [facility] 3-4x this yr for recurrent UTIs, most recently 3 weeks ago. His son is concerned that his symptoms are related to progressive dementia rather than another UTI. Last month he spoke to the PMD Dr. Levi ([facility] house call physician) who discussed hospice with the son.  Pt currently lives with his son Devon who helps take care of pt. He has a HHA for 10hrs 7 days per week. He is interested in obtaining more care for his father and speaking further about hospice.     ED Course   VS: 98.6, 66, 96/57->112/61, 20, 100%  Labs: hgb 8.6, hct 26.1, eosinophils 600, Na+ 129, HCO3- 17, Cr 2.41, Glu 117, Trops 0.08, UA- positive   Imaging: CXR- clear, EKG- nsr with 1st deg av block   Received: 1L NS      (13 Sep 2021 23:14)      - Other: iStop reviewed.    - Other: Medication reviewed.    The patient HAS NOT used PRN's in the last 24h.    MEDICATIONS  (STANDING):  carvedilol 12.5 milliGRAM(s) Oral every 12 hours  enoxaparin Injectable 30 milliGRAM(s) SubCutaneous every 24 hours  finasteride 5 milliGRAM(s) Oral daily  NIFEdipine XL 30 milliGRAM(s) Oral daily  pantoprazole    Tablet 40 milliGRAM(s) Oral before breakfast  simvastatin 10 milliGRAM(s) Oral at bedtime  sodium chloride 1 Gram(s) Oral every 12 hours  tamsulosin 0.4 milliGRAM(s) Oral every 12 hours  vancomycin    Solution 125 milliGRAM(s) Oral every 6 hours    MEDICATIONS  (PRN):  acetaminophen   Tablet .. 650 milliGRAM(s) Oral every 6 hours PRN Temp greater or equal to 38C (100.4F), Mild Pain (1 - 3), Moderate Pain (4 - 6)      - Other: Advanced directives     DNR DNI     No documented MOLST form found on [facility]     No documented HCP form found on [facility]     No Living will / POA / Advance directives found on [place] / [facility].     No documented GOC notes on [facility]    - Other: Coordination/Plan of care     _4___ admissions in 1 year ([facility])     Current admission LOS: 1___ days     LACE score: _10___ ADVANCE ILLNESS PATIENT.     Patient NOT previously seen by palliative medicine consult service.      Discussed with  Consult request for: " Alzheimer's dementia   "    Patient is an Advanced Illness patient hence the primary team will need to complete GOC document of any prior GOC discussions that were done during this admission so far as well as information available for Proxy/surrogates/NOK/guardians prior to a palliative contact.    Full consult to follow within 24h.    Will reassess later today during bedside rounds.

## 2021-09-16 NOTE — PROGRESS NOTE ADULT - PROBLEM SELECTOR PLAN 12
F: none  E: replete K <4, Mg <2  N: NPO   GI Ppx: Protonix   DVT Ppx: Lovenox   Code status: FULL   Dispo: [facility]
F: none  E: replete K <4, Mg <2  N: NPO   GI Ppx: Protonix   DVT Ppx: Lovenox   Code status: FULL   Dispo: [facility]

## 2021-09-16 NOTE — PROGRESS NOTE ADULT - PROBLEM SELECTOR PLAN 3
Pt's son reports that pt has had diarrhea within the last two weeks. PCP (Joseluis) reports a positive C diff test from a month ago. Pt currently asymptomatic. WBC wnl, no fever. Denies any diarrhea currently.  -D/c'd ceftriaxone  - c/w vanc PO 125mg q6  - isolation precautions started

## 2021-09-16 NOTE — PROGRESS NOTE ADULT - PROBLEM SELECTOR PLAN 1
Presenting with 3 days of decrease po intake. ROS neg. UA positive, in setting of chronic yun, although contaminated. CXR clear, lactate wnl, appears non-toxic. FTT likely 2/2  progressive dementia. Pt's son spoke with PMD 1 month ago about hospice as pt is hospice eligible per paperwork brought in from PMD Dr. Maggi Levi.   - F/u palliative consult  - Speech and swallow rec advance to pureed and thin liquids

## 2021-09-16 NOTE — PROGRESS NOTE ADULT - SUBJECTIVE AND OBJECTIVE BOX
CC: Patient is a 86y old  Male who presents with a chief complaint of FTT,UTI (15 Sep 2021 15:52)      INTERVAL EVENTS: MIO    SUBJECTIVE / INTERVAL HPI: Patient seen and examined at bedside.     ROS: negative unless otherwise stated above.    VITAL SIGNS:  Vital Signs Last 24 Hrs  T(C): 36.4 (16 Sep 2021 05:32), Max: 36.4 (15 Sep 2021 20:55)  T(F): 97.6 (16 Sep 2021 05:32), Max: 97.6 (15 Sep 2021 20:55)  HR: 65 (16 Sep 2021 05:32) (65 - 66)  BP: 128/63 (16 Sep 2021 05:32) (127/65 - 128/63)  BP(mean): --  RR: 19 (16 Sep 2021 05:32) (18 - 19)  SpO2: 100% (16 Sep 2021 05:32) (99% - 100%)      09-15-21 @ 07:01  -  09-16-21 @ 07:00  --------------------------------------------------------  IN: 0 mL / OUT: 300 mL / NET: -300 mL    09-16-21 @ 07:01  -  09-16-21 @ 16:42  --------------------------------------------------------  IN: 0 mL / OUT: 400 mL / NET: -400 mL        PHYSICAL EXAM:    General: NAD, frail elderly man in bed  HEENT: MMM  Neck: supple  Cardiovascular: +S1/S2; RRR  Respiratory: CTA B/L; no W/R/R  Gastrointestinal: soft, NT/ND  Extremities: WWP; no edema, clubbing or cyanosis  Vascular: 2+ radial, DP/PT pulses B/L  Neurological: AAOx1; no focal deficits    MEDICATIONS:  MEDICATIONS  (STANDING):  carvedilol 12.5 milliGRAM(s) Oral every 12 hours  enoxaparin Injectable 30 milliGRAM(s) SubCutaneous every 24 hours  finasteride 5 milliGRAM(s) Oral daily  NIFEdipine XL 30 milliGRAM(s) Oral daily  pantoprazole    Tablet 40 milliGRAM(s) Oral before breakfast  simvastatin 10 milliGRAM(s) Oral at bedtime  sodium chloride 1 Gram(s) Oral every 12 hours  tamsulosin 0.4 milliGRAM(s) Oral every 12 hours  vancomycin    Solution 125 milliGRAM(s) Oral every 6 hours    MEDICATIONS  (PRN):  acetaminophen   Tablet .. 650 milliGRAM(s) Oral every 6 hours PRN Temp greater or equal to 38C (100.4F), Mild Pain (1 - 3), Moderate Pain (4 - 6)      ALLERGIES:  Allergies    penicillins (Unknown)    Intolerances        LABS:                        8.5    9.29  )-----------( 347      ( 16 Sep 2021 12:47 )             25.8     09-16    129<L>  |  103  |  15  ----------------------------<  105<H>  4.6   |  17<L>  |  2.13<H>    Ca    9.0      16 Sep 2021 12:47  Mg     2.3     09-15    TPro  6.5  /  Alb  2.8<L>  /  TBili  0.2  /  DBili  x   /  AST  10  /  ALT  <5<L>  /  AlkPhos  74  09-16        CAPILLARY BLOOD GLUCOSE          RADIOLOGY & ADDITIONAL TESTS: Reviewed.

## 2021-09-17 VITALS
TEMPERATURE: 98 F | OXYGEN SATURATION: 98 % | SYSTOLIC BLOOD PRESSURE: 117 MMHG | DIASTOLIC BLOOD PRESSURE: 56 MMHG | RESPIRATION RATE: 18 BRPM | HEART RATE: 66 BPM

## 2021-09-17 LAB
ALBUMIN SERPL ELPH-MCNC: 2.7 G/DL — LOW (ref 3.3–5)
ALP SERPL-CCNC: 73 U/L — SIGNIFICANT CHANGE UP (ref 40–120)
ALT FLD-CCNC: <5 U/L — LOW (ref 10–45)
ANION GAP SERPL CALC-SCNC: 13 MMOL/L — SIGNIFICANT CHANGE UP (ref 5–17)
AST SERPL-CCNC: 11 U/L — SIGNIFICANT CHANGE UP (ref 10–40)
BASOPHILS # BLD AUTO: 0.03 K/UL — SIGNIFICANT CHANGE UP (ref 0–0.2)
BASOPHILS NFR BLD AUTO: 0.4 % — SIGNIFICANT CHANGE UP (ref 0–2)
BILIRUB SERPL-MCNC: 0.3 MG/DL — SIGNIFICANT CHANGE UP (ref 0.2–1.2)
BUN SERPL-MCNC: 11 MG/DL — SIGNIFICANT CHANGE UP (ref 7–23)
CALCIUM SERPL-MCNC: 8.6 MG/DL — SIGNIFICANT CHANGE UP (ref 8.4–10.5)
CHLORIDE SERPL-SCNC: 100 MMOL/L — SIGNIFICANT CHANGE UP (ref 96–108)
CO2 SERPL-SCNC: 17 MMOL/L — LOW (ref 22–31)
CREAT SERPL-MCNC: 1.88 MG/DL — HIGH (ref 0.5–1.3)
EOSINOPHIL # BLD AUTO: 1.12 K/UL — HIGH (ref 0–0.5)
EOSINOPHIL NFR BLD AUTO: 13.4 % — HIGH (ref 0–6)
GLUCOSE SERPL-MCNC: 92 MG/DL — SIGNIFICANT CHANGE UP (ref 70–99)
HCT VFR BLD CALC: 25.9 % — LOW (ref 39–50)
HGB BLD-MCNC: 8.7 G/DL — LOW (ref 13–17)
IMM GRANULOCYTES NFR BLD AUTO: 0.6 % — SIGNIFICANT CHANGE UP (ref 0–1.5)
LYMPHOCYTES # BLD AUTO: 1.15 K/UL — SIGNIFICANT CHANGE UP (ref 1–3.3)
LYMPHOCYTES # BLD AUTO: 13.8 % — SIGNIFICANT CHANGE UP (ref 13–44)
MAGNESIUM SERPL-MCNC: 2 MG/DL — SIGNIFICANT CHANGE UP (ref 1.6–2.6)
MCHC RBC-ENTMCNC: 30.2 PG — SIGNIFICANT CHANGE UP (ref 27–34)
MCHC RBC-ENTMCNC: 33.6 GM/DL — SIGNIFICANT CHANGE UP (ref 32–36)
MCV RBC AUTO: 89.9 FL — SIGNIFICANT CHANGE UP (ref 80–100)
MONOCYTES # BLD AUTO: 0.65 K/UL — SIGNIFICANT CHANGE UP (ref 0–0.9)
MONOCYTES NFR BLD AUTO: 7.8 % — SIGNIFICANT CHANGE UP (ref 2–14)
NEUTROPHILS # BLD AUTO: 5.36 K/UL — SIGNIFICANT CHANGE UP (ref 1.8–7.4)
NEUTROPHILS NFR BLD AUTO: 64 % — SIGNIFICANT CHANGE UP (ref 43–77)
NRBC # BLD: 0 /100 WBCS — SIGNIFICANT CHANGE UP (ref 0–0)
PHOSPHATE SERPL-MCNC: 2.5 MG/DL — SIGNIFICANT CHANGE UP (ref 2.5–4.5)
PLATELET # BLD AUTO: 345 K/UL — SIGNIFICANT CHANGE UP (ref 150–400)
POTASSIUM SERPL-MCNC: 4.4 MMOL/L — SIGNIFICANT CHANGE UP (ref 3.5–5.3)
POTASSIUM SERPL-SCNC: 4.4 MMOL/L — SIGNIFICANT CHANGE UP (ref 3.5–5.3)
PROT SERPL-MCNC: 6.1 G/DL — SIGNIFICANT CHANGE UP (ref 6–8.3)
RBC # BLD: 2.88 M/UL — LOW (ref 4.2–5.8)
RBC # FLD: 15.4 % — HIGH (ref 10.3–14.5)
SODIUM SERPL-SCNC: 130 MMOL/L — LOW (ref 135–145)
WBC # BLD: 8.36 K/UL — SIGNIFICANT CHANGE UP (ref 3.8–10.5)
WBC # FLD AUTO: 8.36 K/UL — SIGNIFICANT CHANGE UP (ref 3.8–10.5)

## 2021-09-17 PROCEDURE — 86901 BLOOD TYPING SEROLOGIC RH(D): CPT

## 2021-09-17 PROCEDURE — 86900 BLOOD TYPING SEROLOGIC ABO: CPT

## 2021-09-17 PROCEDURE — 71045 X-RAY EXAM CHEST 1 VIEW: CPT

## 2021-09-17 PROCEDURE — 99285 EMERGENCY DEPT VISIT HI MDM: CPT | Mod: 25

## 2021-09-17 PROCEDURE — 99239 HOSP IP/OBS DSCHRG MGMT >30: CPT

## 2021-09-17 PROCEDURE — 87040 BLOOD CULTURE FOR BACTERIA: CPT

## 2021-09-17 PROCEDURE — 81001 URINALYSIS AUTO W/SCOPE: CPT

## 2021-09-17 PROCEDURE — 82607 VITAMIN B-12: CPT

## 2021-09-17 PROCEDURE — 36000 PLACE NEEDLE IN VEIN: CPT

## 2021-09-17 PROCEDURE — 84100 ASSAY OF PHOSPHORUS: CPT

## 2021-09-17 PROCEDURE — 85025 COMPLETE CBC W/AUTO DIFF WBC: CPT

## 2021-09-17 PROCEDURE — 87186 SC STD MICRODIL/AGAR DIL: CPT

## 2021-09-17 PROCEDURE — 87635 SARS-COV-2 COVID-19 AMP PRB: CPT

## 2021-09-17 PROCEDURE — 82553 CREATINE MB FRACTION: CPT

## 2021-09-17 PROCEDURE — 36415 COLL VENOUS BLD VENIPUNCTURE: CPT

## 2021-09-17 PROCEDURE — 83036 HEMOGLOBIN GLYCOSYLATED A1C: CPT

## 2021-09-17 PROCEDURE — 83605 ASSAY OF LACTIC ACID: CPT

## 2021-09-17 PROCEDURE — 82550 ASSAY OF CK (CPK): CPT

## 2021-09-17 PROCEDURE — 82746 ASSAY OF FOLIC ACID SERUM: CPT

## 2021-09-17 PROCEDURE — 83735 ASSAY OF MAGNESIUM: CPT

## 2021-09-17 PROCEDURE — 87086 URINE CULTURE/COLONY COUNT: CPT

## 2021-09-17 PROCEDURE — 92610 EVALUATE SWALLOWING FUNCTION: CPT

## 2021-09-17 PROCEDURE — 83540 ASSAY OF IRON: CPT

## 2021-09-17 PROCEDURE — 80053 COMPREHEN METABOLIC PANEL: CPT

## 2021-09-17 PROCEDURE — 93005 ELECTROCARDIOGRAM TRACING: CPT

## 2021-09-17 PROCEDURE — 80048 BASIC METABOLIC PNL TOTAL CA: CPT

## 2021-09-17 PROCEDURE — 84484 ASSAY OF TROPONIN QUANT: CPT

## 2021-09-17 PROCEDURE — 82728 ASSAY OF FERRITIN: CPT

## 2021-09-17 PROCEDURE — 86850 RBC ANTIBODY SCREEN: CPT

## 2021-09-17 PROCEDURE — 83550 IRON BINDING TEST: CPT

## 2021-09-17 PROCEDURE — 86769 SARS-COV-2 COVID-19 ANTIBODY: CPT

## 2021-09-17 RX ORDER — VANCOMYCIN HCL 1 G
1 VIAL (EA) INTRAVENOUS
Qty: 32 | Refills: 0
Start: 2021-09-17 | End: 2021-09-24

## 2021-09-17 RX ORDER — VANCOMYCIN HCL 1 G
125 VIAL (EA) INTRAVENOUS
Qty: 400 | Refills: 0
Start: 2021-09-17 | End: 2021-09-24

## 2021-09-17 RX ORDER — TAMSULOSIN HYDROCHLORIDE 0.4 MG/1
1 CAPSULE ORAL
Qty: 0 | Refills: 0 | DISCHARGE

## 2021-09-17 RX ORDER — CANDESARTAN CILEXETIL 8 MG/1
1 TABLET ORAL
Qty: 0 | Refills: 0 | DISCHARGE

## 2021-09-17 RX ORDER — SOD SULF/SODIUM/NAHCO3/KCL/PEG
0 SOLUTION, RECONSTITUTED, ORAL ORAL
Qty: 0 | Refills: 0 | DISCHARGE

## 2021-09-17 RX ADMIN — Medication 125 MILLIGRAM(S): at 00:04

## 2021-09-17 RX ADMIN — Medication 30 MILLIGRAM(S): at 06:32

## 2021-09-17 RX ADMIN — ENOXAPARIN SODIUM 30 MILLIGRAM(S): 100 INJECTION SUBCUTANEOUS at 06:32

## 2021-09-17 RX ADMIN — PANTOPRAZOLE SODIUM 40 MILLIGRAM(S): 20 TABLET, DELAYED RELEASE ORAL at 06:32

## 2021-09-17 RX ADMIN — SIMVASTATIN 10 MILLIGRAM(S): 20 TABLET, FILM COATED ORAL at 00:04

## 2021-09-17 RX ADMIN — Medication 125 MILLIGRAM(S): at 12:43

## 2021-09-17 RX ADMIN — SODIUM CHLORIDE 1 GRAM(S): 9 INJECTION INTRAMUSCULAR; INTRAVENOUS; SUBCUTANEOUS at 06:32

## 2021-09-17 RX ADMIN — CARVEDILOL PHOSPHATE 12.5 MILLIGRAM(S): 80 CAPSULE, EXTENDED RELEASE ORAL at 06:31

## 2021-09-17 RX ADMIN — Medication 125 MILLIGRAM(S): at 06:31

## 2021-09-17 RX ADMIN — FINASTERIDE 5 MILLIGRAM(S): 5 TABLET, FILM COATED ORAL at 12:43

## 2021-09-17 RX ADMIN — TAMSULOSIN HYDROCHLORIDE 0.4 MILLIGRAM(S): 0.4 CAPSULE ORAL at 06:32

## 2021-09-17 NOTE — PROGRESS NOTE ADULT - PROBLEM SELECTOR PLAN 3
assist w/ ADLs, provide frequent re-orientation; dysphagia diet, per SLP recs; Palliative Care following, hospice planning

## 2021-09-17 NOTE — PROGRESS NOTE ADULT - TIME BILLING
Dispo: pending clinical progress, cx data  Palliative Care consult  DNR/DNI
Dispo: medically-clear for d/c home w/ hospice services  DNR/DNI
Dispo: d/c planning  Palliative Care consult  DNR/DNI

## 2021-09-17 NOTE — PROGRESS NOTE ADULT - NUTRITIONAL ASSESSMENT
This patient has been assessed with a concern for Malnutrition and has been determined to have a diagnosis/diagnoses of Severe protein-calorie malnutrition.    This patient is being managed with:   Diet Dysphagia 1 Pureed-Thin Liquids-  Supplement Feeding Modality:  Oral  Ensure Enlive Servings Per Day:  1       Frequency:  Two Times a day  Glucerna Shake Cans or Servings Per Day:  1       Frequency:  Two Times a day  Entered: Sep 14 2021  2:20PM    
Patient started on pureed and thin liquid diet. Patient claims he is not hungry and is not tolerating, had two episodes of vomit last night. Will continue to monitor.
This patient has been assessed with a concern for Malnutrition and has been determined to have a diagnosis/diagnoses of Severe protein-calorie malnutrition.    This patient is being managed with:   Diet Dysphagia 1 Pureed-Thin Liquids-  Supplement Feeding Modality:  Oral  Ensure Enlive Servings Per Day:  1       Frequency:  Two Times a day  Glucerna Shake Cans or Servings Per Day:  1       Frequency:  Two Times a day  Entered: Sep 14 2021  2:20PM    
This patient has been assessed with a concern for Malnutrition and has been determined to have a diagnosis/diagnoses of Severe protein-calorie malnutrition.    This patient is being managed with:   Diet Dysphagia 1 Pureed-Thin Liquids-  Supplement Feeding Modality:  Oral  Ensure Enlive Servings Per Day:  1       Frequency:  Two Times a day  Glucerna Shake Cans or Servings Per Day:  1       Frequency:  Two Times a day  Entered: Sep 14 2021  2:20PM

## 2021-09-17 NOTE — PROGRESS NOTE ADULT - PROVIDER SPECIALTY LIST ADULT
Internal Medicine
Hospitalist
Hospitalist
Palliative Care
Internal Medicine
Hospitalist
Internal Medicine

## 2021-09-17 NOTE — PROGRESS NOTE ADULT - PROBLEM SELECTOR PLAN 1
reportedly tested positive recently as outpatient, per collateral obtained from PCP by housestaff; cont. PO vanc (day #3/10) and contact precautions; cont. serial abdominal exams, diet as tolerated; Epidemiology notified, will try to obtain records

## 2021-09-17 NOTE — PROGRESS NOTE ADULT - SUBJECTIVE AND OBJECTIVE BOX
Patient is a 86y old  Male who presents with a chief complaint of FTT,UTI (16 Sep 2021 16:42)      INTERVAL HPI/OVERNIGHT EVENTS:    Pt. seen and examined earlier today  Pt.'s son present at bedside  No complaints elicited from Pt.  Poor PO intake and occasional N/V, per report  No diarrhea, per report    Review of Systems: 12 point review of systems otherwise negative    MEDICATIONS  (STANDING):  carvedilol 12.5 milliGRAM(s) Oral every 12 hours  enoxaparin Injectable 30 milliGRAM(s) SubCutaneous every 24 hours  finasteride 5 milliGRAM(s) Oral daily  NIFEdipine XL 30 milliGRAM(s) Oral daily  pantoprazole    Tablet 40 milliGRAM(s) Oral before breakfast  simvastatin 10 milliGRAM(s) Oral at bedtime  sodium chloride 1 Gram(s) Oral every 12 hours  tamsulosin 0.4 milliGRAM(s) Oral every 12 hours  vancomycin    Solution 125 milliGRAM(s) Oral every 6 hours    MEDICATIONS  (PRN):  acetaminophen   Tablet .. 650 milliGRAM(s) Oral every 6 hours PRN Temp greater or equal to 38C (100.4F), Mild Pain (1 - 3), Moderate Pain (4 - 6)      Allergies    penicillins (Unknown)    Intolerances          Vital Signs Last 24 Hrs  T(C): 36.4 (17 Sep 2021 09:41), Max: 36.6 (17 Sep 2021 05:00)  T(F): 97.5 (17 Sep 2021 09:41), Max: 97.8 (17 Sep 2021 05:00)  HR: 66 (17 Sep 2021 09:41) (66 - 74)  BP: 117/56 (17 Sep 2021 09:41) (117/56 - 134/67)  BP(mean): --  RR: 18 (17 Sep 2021 09:41) (16 - 18)  SpO2: 98% (17 Sep 2021 09:41) (98% - 100%)  CAPILLARY BLOOD GLUCOSE          09-16 @ 07:01  -  09-17 @ 07:00  --------------------------------------------------------  IN: 0 mL / OUT: 675 mL / NET: -675 mL        Physical Exam:  (earlier today)  Daily     Daily   General:  comfortable-appearing in NAD  HEENT:  MMM  CV:  RRR  Lungs:  CTA B/L  Abdomen:  soft NT ND  Extremities:  no edema B/L LE  Skin:  WWP  :  +Saleh POA  Neuro:  AAOx1 (baseline)    LABS:                        8.7    8.36  )-----------( 345      ( 17 Sep 2021 08:00 )             25.9     09-17    130<L>  |  100  |  11  ----------------------------<  92  4.4   |  17<L>  |  1.88<H>    Ca    8.6      17 Sep 2021 08:00  Phos  2.5     09-17  Mg     2.0     09-17    TPro  6.1  /  Alb  2.7<L>  /  TBili  0.3  /  DBili  x   /  AST  11  /  ALT  <5<L>  /  AlkPhos  73  09-17

## 2021-09-17 NOTE — PROGRESS NOTE ADULT - PROBLEM SELECTOR PROBLEM 1
Catheter-associated urinary tract infection
Clostridium difficile infection
Failure to thrive in adult
Clostridium difficile infection
Failure to thrive in adult

## 2021-09-19 LAB
CULTURE RESULTS: SIGNIFICANT CHANGE UP
CULTURE RESULTS: SIGNIFICANT CHANGE UP
SPECIMEN SOURCE: SIGNIFICANT CHANGE UP
SPECIMEN SOURCE: SIGNIFICANT CHANGE UP

## 2021-09-22 ENCOUNTER — APPOINTMENT (OUTPATIENT)
Dept: UROLOGY | Facility: CLINIC | Age: 86
End: 2021-09-22

## 2021-09-22 DIAGNOSIS — E43 UNSPECIFIED SEVERE PROTEIN-CALORIE MALNUTRITION: ICD-10-CM

## 2021-09-22 DIAGNOSIS — N40.1 BENIGN PROSTATIC HYPERPLASIA WITH LOWER URINARY TRACT SYMPTOMS: ICD-10-CM

## 2021-09-22 DIAGNOSIS — N39.0 URINARY TRACT INFECTION, SITE NOT SPECIFIED: ICD-10-CM

## 2021-09-22 DIAGNOSIS — G30.9 ALZHEIMER'S DISEASE, UNSPECIFIED: ICD-10-CM

## 2021-09-22 DIAGNOSIS — T83.511A INFECTION AND INFLAMMATORY REACTION DUE TO INDWELLING URETHRAL CATHETER, INITIAL ENCOUNTER: ICD-10-CM

## 2021-09-22 DIAGNOSIS — E87.1 HYPO-OSMOLALITY AND HYPONATREMIA: ICD-10-CM

## 2021-09-22 DIAGNOSIS — R62.7 ADULT FAILURE TO THRIVE: ICD-10-CM

## 2021-09-22 DIAGNOSIS — Z51.5 ENCOUNTER FOR PALLIATIVE CARE: ICD-10-CM

## 2021-09-22 DIAGNOSIS — D63.8 ANEMIA IN OTHER CHRONIC DISEASES CLASSIFIED ELSEWHERE: ICD-10-CM

## 2021-09-22 DIAGNOSIS — I12.9 HYPERTENSIVE CHRONIC KIDNEY DISEASE WITH STAGE 1 THROUGH STAGE 4 CHRONIC KIDNEY DISEASE, OR UNSPECIFIED CHRONIC KIDNEY DISEASE: ICD-10-CM

## 2021-09-22 DIAGNOSIS — Z88.0 ALLERGY STATUS TO PENICILLIN: ICD-10-CM
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
## 2021-09-22 DIAGNOSIS — E11.40 TYPE 2 DIABETES MELLITUS WITH DIABETIC NEUROPATHY, UNSPECIFIED: ICD-10-CM

## 2021-09-22 DIAGNOSIS — R60.1 GENERALIZED EDEMA: ICD-10-CM

## 2021-09-22 DIAGNOSIS — Z66 DO NOT RESUSCITATE: ICD-10-CM

## 2021-09-22 DIAGNOSIS — Z91.81 HISTORY OF FALLING: ICD-10-CM

## 2021-09-22 DIAGNOSIS — M54.9 DORSALGIA, UNSPECIFIED: ICD-10-CM

## 2021-09-22 DIAGNOSIS — R53.81 OTHER MALAISE: ICD-10-CM

## 2021-09-22 DIAGNOSIS — F02.80 DEMENTIA IN OTHER DISEASES CLASSIFIED ELSEWHERE, UNSPECIFIED SEVERITY, WITHOUT BEHAVIORAL DISTURBANCE, PSYCHOTIC DISTURBANCE, MOOD DISTURBANCE, AND ANXIETY: ICD-10-CM

## 2021-09-22 DIAGNOSIS — Z96.0 PRESENCE OF UROGENITAL IMPLANTS: ICD-10-CM

## 2021-09-22 DIAGNOSIS — R64 CACHEXIA: ICD-10-CM

## 2021-09-22 DIAGNOSIS — E86.0 DEHYDRATION: ICD-10-CM

## 2021-09-22 DIAGNOSIS — Z74.01 BED CONFINEMENT STATUS: ICD-10-CM

## 2021-09-22 DIAGNOSIS — A04.71 ENTEROCOLITIS DUE TO CLOSTRIDIUM DIFFICILE, RECURRENT: ICD-10-CM

## 2021-09-22 DIAGNOSIS — B96.20 UNSPECIFIED ESCHERICHIA COLI [E. COLI] AS THE CAUSE OF DISEASES CLASSIFIED ELSEWHERE: ICD-10-CM

## 2021-09-22 DIAGNOSIS — E83.42 HYPOMAGNESEMIA: ICD-10-CM
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
## 2021-09-22 DIAGNOSIS — G89.29 OTHER CHRONIC PAIN: ICD-10-CM

## 2021-09-22 DIAGNOSIS — N18.4 CHRONIC KIDNEY DISEASE, STAGE 4 (SEVERE): ICD-10-CM

## 2021-09-22 DIAGNOSIS — E11.22 TYPE 2 DIABETES MELLITUS WITH DIABETIC CHRONIC KIDNEY DISEASE: ICD-10-CM

## 2021-10-11 ENCOUNTER — EMERGENCY (EMERGENCY)
Facility: HOSPITAL | Age: 86
LOS: 1 days | Discharge: HOME | End: 2021-10-11
Attending: EMERGENCY MEDICINE | Admitting: EMERGENCY MEDICINE
Payer: MEDICARE

## 2021-10-11 VITALS
HEART RATE: 74 BPM | TEMPERATURE: 98 F | DIASTOLIC BLOOD PRESSURE: 70 MMHG | RESPIRATION RATE: 22 BRPM | OXYGEN SATURATION: 100 % | SYSTOLIC BLOOD PRESSURE: 123 MMHG | HEIGHT: 69 IN | WEIGHT: 134.92 LBS

## 2021-10-11 VITALS
TEMPERATURE: 98 F | OXYGEN SATURATION: 99 % | RESPIRATION RATE: 20 BRPM | HEART RATE: 75 BPM | SYSTOLIC BLOOD PRESSURE: 137 MMHG | DIASTOLIC BLOOD PRESSURE: 75 MMHG

## 2021-10-11 DIAGNOSIS — N18.4 CHRONIC KIDNEY DISEASE, STAGE 4 (SEVERE): ICD-10-CM

## 2021-10-11 DIAGNOSIS — Z66 DO NOT RESUSCITATE: ICD-10-CM

## 2021-10-11 DIAGNOSIS — Z98.890 OTHER SPECIFIED POSTPROCEDURAL STATES: Chronic | ICD-10-CM

## 2021-10-11 DIAGNOSIS — G30.1 ALZHEIMER'S DISEASE WITH LATE ONSET: ICD-10-CM

## 2021-10-11 DIAGNOSIS — E11.22 TYPE 2 DIABETES MELLITUS WITH DIABETIC CHRONIC KIDNEY DISEASE: ICD-10-CM

## 2021-10-11 DIAGNOSIS — F02.80 DEMENTIA IN OTHER DISEASES CLASSIFIED ELSEWHERE, UNSPECIFIED SEVERITY, WITHOUT BEHAVIORAL DISTURBANCE, PSYCHOTIC DISTURBANCE, MOOD DISTURBANCE, AND ANXIETY: ICD-10-CM

## 2021-10-11 DIAGNOSIS — R11.2 NAUSEA WITH VOMITING, UNSPECIFIED: ICD-10-CM

## 2021-10-11 DIAGNOSIS — R11.10 VOMITING, UNSPECIFIED: ICD-10-CM

## 2021-10-11 DIAGNOSIS — Z74.01 BED CONFINEMENT STATUS: ICD-10-CM

## 2021-10-11 DIAGNOSIS — N40.0 BENIGN PROSTATIC HYPERPLASIA WITHOUT LOWER URINARY TRACT SYMPTOMS: ICD-10-CM

## 2021-10-11 DIAGNOSIS — Z88.0 ALLERGY STATUS TO PENICILLIN: ICD-10-CM

## 2021-10-11 DIAGNOSIS — I12.9 HYPERTENSIVE CHRONIC KIDNEY DISEASE WITH STAGE 1 THROUGH STAGE 4 CHRONIC KIDNEY DISEASE, OR UNSPECIFIED CHRONIC KIDNEY DISEASE: ICD-10-CM

## 2021-10-11 DIAGNOSIS — Z87.442 PERSONAL HISTORY OF URINARY CALCULI: ICD-10-CM

## 2021-10-11 DIAGNOSIS — E87.1 HYPO-OSMOLALITY AND HYPONATREMIA: ICD-10-CM

## 2021-10-11 DIAGNOSIS — R62.7 ADULT FAILURE TO THRIVE: ICD-10-CM

## 2021-10-11 DIAGNOSIS — G82.50 QUADRIPLEGIA, UNSPECIFIED: ICD-10-CM

## 2021-10-11 DIAGNOSIS — E11.40 TYPE 2 DIABETES MELLITUS WITH DIABETIC NEUROPATHY, UNSPECIFIED: ICD-10-CM

## 2021-10-11 DIAGNOSIS — Z20.822 CONTACT WITH AND (SUSPECTED) EXPOSURE TO COVID-19: ICD-10-CM

## 2021-10-11 PROBLEM — Z97.8 PRESENCE OF OTHER SPECIFIED DEVICES: Chronic | Status: ACTIVE | Noted: 2021-09-13

## 2021-10-11 PROBLEM — N18.9 CHRONIC KIDNEY DISEASE, UNSPECIFIED: Chronic | Status: ACTIVE | Noted: 2021-09-13

## 2021-10-11 PROBLEM — I10 ESSENTIAL (PRIMARY) HYPERTENSION: Chronic | Status: ACTIVE | Noted: 2021-09-13

## 2021-10-11 PROBLEM — E11.9 TYPE 2 DIABETES MELLITUS WITHOUT COMPLICATIONS: Chronic | Status: ACTIVE | Noted: 2021-09-13

## 2021-10-11 PROBLEM — F03.90 UNSPECIFIED DEMENTIA WITHOUT BEHAVIORAL DISTURBANCE: Chronic | Status: ACTIVE | Noted: 2021-09-14

## 2021-10-11 PROBLEM — Z87.438 PERSONAL HISTORY OF OTHER DISEASES OF MALE GENITAL ORGANS: Chronic | Status: ACTIVE | Noted: 2021-09-13

## 2021-10-11 LAB
ALBUMIN SERPL ELPH-MCNC: 3.2 G/DL — LOW (ref 3.3–5)
ALP SERPL-CCNC: 72 U/L — SIGNIFICANT CHANGE UP (ref 40–120)
ALT FLD-CCNC: <5 U/L — LOW (ref 10–45)
ANION GAP SERPL CALC-SCNC: 12 MMOL/L — SIGNIFICANT CHANGE UP (ref 5–17)
APPEARANCE UR: ABNORMAL
AST SERPL-CCNC: 14 U/L — SIGNIFICANT CHANGE UP (ref 10–40)
BACTERIA # UR AUTO: ABNORMAL /HPF
BASOPHILS # BLD AUTO: 0.03 K/UL — SIGNIFICANT CHANGE UP (ref 0–0.2)
BASOPHILS NFR BLD AUTO: 0.5 % — SIGNIFICANT CHANGE UP (ref 0–2)
BILIRUB SERPL-MCNC: 0.3 MG/DL — SIGNIFICANT CHANGE UP (ref 0.2–1.2)
BILIRUB UR-MCNC: NEGATIVE — SIGNIFICANT CHANGE UP
BUN SERPL-MCNC: 15 MG/DL — SIGNIFICANT CHANGE UP (ref 7–23)
CALCIUM SERPL-MCNC: 8.8 MG/DL — SIGNIFICANT CHANGE UP (ref 8.4–10.5)
CHLORIDE SERPL-SCNC: 104 MMOL/L — SIGNIFICANT CHANGE UP (ref 96–108)
CO2 SERPL-SCNC: 15 MMOL/L — LOW (ref 22–31)
COLOR SPEC: YELLOW — SIGNIFICANT CHANGE UP
CREAT SERPL-MCNC: 1.99 MG/DL — HIGH (ref 0.5–1.3)
DIFF PNL FLD: ABNORMAL
EOSINOPHIL # BLD AUTO: 0.02 K/UL — SIGNIFICANT CHANGE UP (ref 0–0.5)
EOSINOPHIL NFR BLD AUTO: 0.3 % — SIGNIFICANT CHANGE UP (ref 0–6)
EPI CELLS # UR: SIGNIFICANT CHANGE UP /HPF (ref 0–5)
GLUCOSE SERPL-MCNC: 96 MG/DL — SIGNIFICANT CHANGE UP (ref 70–99)
GLUCOSE UR QL: NEGATIVE — SIGNIFICANT CHANGE UP
HCT VFR BLD CALC: 27.6 % — LOW (ref 39–50)
HGB BLD-MCNC: 9.1 G/DL — LOW (ref 13–17)
IMM GRANULOCYTES NFR BLD AUTO: 0.3 % — SIGNIFICANT CHANGE UP (ref 0–1.5)
KETONES UR-MCNC: NEGATIVE — SIGNIFICANT CHANGE UP
LACTATE SERPL-SCNC: 1.2 MMOL/L — SIGNIFICANT CHANGE UP (ref 0.5–2)
LEUKOCYTE ESTERASE UR-ACNC: ABNORMAL
LYMPHOCYTES # BLD AUTO: 0.85 K/UL — LOW (ref 1–3.3)
LYMPHOCYTES # BLD AUTO: 13 % — SIGNIFICANT CHANGE UP (ref 13–44)
MCHC RBC-ENTMCNC: 30.3 PG — SIGNIFICANT CHANGE UP (ref 27–34)
MCHC RBC-ENTMCNC: 33 GM/DL — SIGNIFICANT CHANGE UP (ref 32–36)
MCV RBC AUTO: 92 FL — SIGNIFICANT CHANGE UP (ref 80–100)
MONOCYTES # BLD AUTO: 0.29 K/UL — SIGNIFICANT CHANGE UP (ref 0–0.9)
MONOCYTES NFR BLD AUTO: 4.4 % — SIGNIFICANT CHANGE UP (ref 2–14)
NEUTROPHILS # BLD AUTO: 5.31 K/UL — SIGNIFICANT CHANGE UP (ref 1.8–7.4)
NEUTROPHILS NFR BLD AUTO: 81.5 % — HIGH (ref 43–77)
NITRITE UR-MCNC: NEGATIVE — SIGNIFICANT CHANGE UP
NRBC # BLD: 0 /100 WBCS — SIGNIFICANT CHANGE UP (ref 0–0)
PH UR: 6 — SIGNIFICANT CHANGE UP (ref 5–8)
PLATELET # BLD AUTO: 440 K/UL — HIGH (ref 150–400)
POTASSIUM SERPL-MCNC: 4.8 MMOL/L — SIGNIFICANT CHANGE UP (ref 3.5–5.3)
POTASSIUM SERPL-SCNC: 4.8 MMOL/L — SIGNIFICANT CHANGE UP (ref 3.5–5.3)
PROT SERPL-MCNC: 7.2 G/DL — SIGNIFICANT CHANGE UP (ref 6–8.3)
PROT UR-MCNC: NEGATIVE MG/DL — SIGNIFICANT CHANGE UP
RBC # BLD: 3 M/UL — LOW (ref 4.2–5.8)
RBC # FLD: 17.6 % — HIGH (ref 10.3–14.5)
RBC CASTS # UR COMP ASSIST: < 5 /HPF — SIGNIFICANT CHANGE UP
SARS-COV-2 RNA SPEC QL NAA+PROBE: NEGATIVE — SIGNIFICANT CHANGE UP
SODIUM SERPL-SCNC: 131 MMOL/L — LOW (ref 135–145)
SP GR SPEC: 1.01 — SIGNIFICANT CHANGE UP (ref 1–1.03)
UROBILINOGEN FLD QL: 0.2 E.U./DL — SIGNIFICANT CHANGE UP
WBC # BLD: 6.52 K/UL — SIGNIFICANT CHANGE UP (ref 3.8–10.5)
WBC # FLD AUTO: 6.52 K/UL — SIGNIFICANT CHANGE UP (ref 3.8–10.5)
WBC UR QL: ABNORMAL /HPF

## 2021-10-11 PROCEDURE — 87635 SARS-COV-2 COVID-19 AMP PRB: CPT

## 2021-10-11 PROCEDURE — 87086 URINE CULTURE/COLONY COUNT: CPT

## 2021-10-11 PROCEDURE — 81001 URINALYSIS AUTO W/SCOPE: CPT

## 2021-10-11 PROCEDURE — 99497 ADVNCD CARE PLAN 30 MIN: CPT

## 2021-10-11 PROCEDURE — 51702 INSERT TEMP BLADDER CATH: CPT

## 2021-10-11 PROCEDURE — 85730 THROMBOPLASTIN TIME PARTIAL: CPT

## 2021-10-11 PROCEDURE — 85610 PROTHROMBIN TIME: CPT

## 2021-10-11 PROCEDURE — 36415 COLL VENOUS BLD VENIPUNCTURE: CPT

## 2021-10-11 PROCEDURE — 85025 COMPLETE CBC W/AUTO DIFF WBC: CPT

## 2021-10-11 PROCEDURE — 99358 PROLONG SERVICE W/O CONTACT: CPT

## 2021-10-11 PROCEDURE — 87040 BLOOD CULTURE FOR BACTERIA: CPT

## 2021-10-11 PROCEDURE — 99284 EMERGENCY DEPT VISIT MOD MDM: CPT

## 2021-10-11 PROCEDURE — 80053 COMPREHEN METABOLIC PANEL: CPT

## 2021-10-11 PROCEDURE — 99285 EMERGENCY DEPT VISIT HI MDM: CPT | Mod: 25

## 2021-10-11 PROCEDURE — 83605 ASSAY OF LACTIC ACID: CPT

## 2021-10-11 PROCEDURE — 87186 SC STD MICRODIL/AGAR DIL: CPT

## 2021-10-11 RX ORDER — OMEPRAZOLE 10 MG/1
1 CAPSULE, DELAYED RELEASE ORAL
Qty: 0 | Refills: 0 | DISCHARGE

## 2021-10-11 RX ORDER — SODIUM CHLORIDE 9 MG/ML
0 INJECTION INTRAMUSCULAR; INTRAVENOUS; SUBCUTANEOUS
Qty: 0 | Refills: 0 | DISCHARGE

## 2021-10-11 RX ORDER — CARVEDILOL PHOSPHATE 80 MG/1
1 CAPSULE, EXTENDED RELEASE ORAL
Qty: 0 | Refills: 0 | DISCHARGE

## 2021-10-11 RX ORDER — SIMVASTATIN 20 MG/1
1 TABLET, FILM COATED ORAL
Qty: 0 | Refills: 0 | DISCHARGE

## 2021-10-11 RX ORDER — FINASTERIDE 5 MG/1
1 TABLET, FILM COATED ORAL
Qty: 0 | Refills: 0 | DISCHARGE

## 2021-10-11 RX ORDER — ONDANSETRON 8 MG/1
5 TABLET, FILM COATED ORAL
Qty: 200 | Refills: 0
Start: 2021-10-11

## 2021-10-11 RX ORDER — SODIUM CHLORIDE 9 MG/ML
1000 INJECTION INTRAMUSCULAR; INTRAVENOUS; SUBCUTANEOUS ONCE
Refills: 0 | Status: COMPLETED | OUTPATIENT
Start: 2021-10-11 | End: 2021-10-11

## 2021-10-11 RX ORDER — NIFEDIPINE 30 MG
1 TABLET, EXTENDED RELEASE 24 HR ORAL
Qty: 0 | Refills: 0 | DISCHARGE

## 2021-10-11 RX ADMIN — SODIUM CHLORIDE 1000 MILLILITER(S): 9 INJECTION INTRAMUSCULAR; INTRAVENOUS; SUBCUTANEOUS at 14:29

## 2021-10-11 NOTE — ED PROVIDER NOTE - PHYSICAL EXAMINATION
CONSTITUTIONAL: Well-appearing; in no apparent distress.   HEAD: Normocephalic; atraumatic.   EYES:  conjunctiva and sclera clear  ENT: normal nose; no rhinorrhea; normal pharynx with no erythema or lesions.   NECK: Supple; non-tender;   CARDIOVASCULAR: Normal S1, S2; no murmurs, rubs, or gallops. Regular rate and rhythm.   RESPIRATORY: Breathing easily; breath sounds clear and equal bilaterally; no wheezes, rhonchi, or rales.  GI: Soft; non-distended; non-tender; no palpable organomegaly.   : yun in place, crystallizations seen  EXT: No cyanosis or edema; N/V intact  SKIN: Normal for age and race; warm; dry; good turgor; no apparent lesions or rash.   NEURO: A & O x 1, not answering questions much but participates in care; face symmetric; grossly unremarkable.   PSYCHOLOGICAL: The patient’s mood and manner are appropriate.

## 2021-10-11 NOTE — CONSULT NOTE ADULT - PROBLEM SELECTOR RECOMMENDATION 5
- 30 minutes with patients son at bedside.  - Patient unable to participate.  - Goals of care discussed.  - DNR/DNI. MOLST in chart.  - Home with hospice on discharge.

## 2021-10-11 NOTE — ED PROVIDER NOTE - NSFOLLOWUPINSTRUCTIONS_ED_ALL_ED_FT
You can give the zofran liquid every morning starting tomorrow, and if he starts dry heaving or vomiting, give the rectal suppository nausea medicine. You can give the zofran as often as 3 times a day.     Nausea / Vomiting    Nausea is the feeling that you have to vomit. As nausea gets worse, it can lead to vomiting. Vomiting puts you at an increased risk for dehydration. Older adults and people with other diseases or a weak immune system are at higher risk for dehydration. Drink clear fluids in small but frequent amounts as tolerated. Eat bland, easy-to-digest foods in small amounts as tolerated.    SEEK IMMEDIATE MEDICAL CARE IF YOU HAVE ANY OF THE FOLLOWING SYMPTOMS: fever, inability to keep sufficient fluids down, black or bloody vomitus, black or bloody stools, lightheadedness/dizziness, chest pain, severe headache, rash, shortness of breath, cold or clammy skin, confusion, pain with urination, or any signs of dehydration.

## 2021-10-11 NOTE — CONSULT NOTE ADULT - PROBLEM SELECTOR RECOMMENDATION 9
- Patient with advanced progressive Alzheimer's dementia.  - FAST >7.   - Bedbound, minimally verbal.   - Dependent on all ADLs.   - Hospice care appropriate on discharge.

## 2021-10-11 NOTE — CONSULT NOTE ADULT - PROBLEM SELECTOR RECOMMENDATION 2
- Patient with progressive decline.  - Now dependent on all ADLs.  - Has not been tolerating oral intake over the last several weeks.   - Prognosis is guarded.  - Liberalize diet as tolerated.

## 2021-10-11 NOTE — ED PROVIDER NOTE - PATIENT PORTAL LINK FT
You can access the FollowMyHealth Patient Portal offered by [facility] by registering at the following website: [email or web address]. By joining [facility]’s FollowMyHealth portal, you will also be able to view your health information using other applications (apps) compatible with our system.

## 2021-10-11 NOTE — CONSULT NOTE ADULT - PROBLEM SELECTOR RECOMMENDATION 4
- Patient with advanced dementia, with progressive decline.  - As per son at bedside, concern over UTI.   - Saleh catheter to be replaced in ED and patient discharged back home with home hospice.

## 2021-10-11 NOTE — ED ADULT NURSE NOTE - OBJECTIVE STATEMENT
Patient presents to the ED from home with son at side with EMS. As per son, patient has been vomiting at home for the past two weeks. Son reports history of chronic UTIs, patient with an indwelling catheter in place. Son states that the nurse at home did a urine culture which was positive for e-coli. Patient awake and alert to self. Son denies any fevers at home.

## 2021-10-11 NOTE — CONSULT NOTE ADULT - SUBJECTIVE AND OBJECTIVE BOX
HPI:    85 yo M with past medical history of dementia (AAOx1 at baseline, bedbound), BPH with chronic yun, recurrent UTIs, DM2, CKD stage 4, neuropathy, chronic hyponatremia, cervical spine dz w/ chronic pain, recently admitted and dc last month for decreased po intake, asymptomatic bacteriuria, presents today w/ N/V inability to tolerate po, dc to home hospice. only 10 hours a day, son primary caregiver, still working full time and struggling. 2 weeks with poor po intake, and vomiting. Hospice RN sent UA +infection so started on macrobid last night. son frustrated with hospice care. pt started on rectal anti nausea meds without improvement as well so came in. no exchange of yun since last hospital admission. no fevers at home, son states has had the RN check daily.   PERTINENT PM/SXH:   HTN (hypertension)    Diabetes mellitus    CKD (chronic kidney disease)    History of BPH    Indwelling Yun catheter present    Hyponatremia    Dementia      H/O Spinal surgery      FAMILY HISTORY:  No pertinent family history in first degree relatives      ITEMS NOT CHECKED ARE NOT PRESENT    SOCIAL HISTORY:   Significant other/partner:  [ ]  Children:  [x ]  Lutheran/Spirituality: Baptist  Substance hx:  [ ]   Tobacco hx:  [ ]   Alcohol hx: [ ]   Home Opioid hx:  [ ] I-Stop Reference No:  Living Situation: [x ]Home  [ ]Long term care  [ ]Rehab [ ]Other    ADVANCE DIRECTIVES:    DNR  MOLST  [x ]  Living Will  [ ]   DECISION MAKER(s):  [x ] Health Care Proxy(s)  [ ] Surrogate(s)  [ ] Guardian           Name(s): Phone Number(s): Fer Dejesus (son)    BASELINE (I)ADL(s) (prior to admission):  Moody: [ ]Total  [ ] Moderate [ x]Dependent    Allergies    penicillins (Unknown)    Intolerances    MEDICATIONS  (STANDING):    MEDICATIONS  (PRN):    PRESENT SYMPTOMS: [x ]Unable to obtain due to poor mentation   Source if other than patient:  [ ]Family   [ ]Team     Pain (Impact on QOL):  Unable  Location -         Minimal acceptable level (0-10 scale):                    Aggravating factors -  Quality -  Radiation -  Severity (0-10 scale) -    Timing -    PAIN AD Score:     http://geriatrictoolkit.[email or web address]/cog/painad.pdf (press ctrl +  left click to view)    Dyspnea:                           [ ]Mild [ ]Moderate [ ]Severe  Anxiety:                             [ ]Mild [ ]Moderate [ ]Severe  Fatigue:                             [ ]Mild [ ]Moderate [ ]Severe  Nausea:                             [ ]Mild [ ]Moderate [ ]Severe  Loss of appetite:              [ ]Mild [ ]Moderate [ ]Severe  Constipation:                    [ ]Mild [ ]Moderate [ ]Severe  Grief Present                    [ ] Yes   [ ] No   Other Symptoms:  [x ]All other review of systems negative     Karnofsky Performance Score/Palliative Performance Status Version 2:         %    http://palliative.info/resource_material/[email or web address].pdf  PHYSICAL EXAM:  Vital Signs Last 24 Hrs  T(C): --   T(F): --97.8  HR: -- 74  BP: -- 123/70  BP(mean): --  RR: -- 12  SpO2: -- 98 I&O's Summary  GENERAL:  [x ]Alert  [x ]Oriented x 1  [ ]Lethargic  [ ]Cachexia  [ ]Unarousable  [ ]Verbal  [ ]Non-Verbal  Behavioral:   [ ] Anxiety  [ ] Delirium [ ] Agitation [ x] Other  HEENT:  [ ]Normal   [x ]Dry mouth   [ ]ET Tube/Trach  [ ]Oral lesions  PULMONARY:   [x ]Clear [ ]Tachypnea  [ ]Audible excessive secretions   [ ]Rhonchi        [ ]Right [ ]Left [ ]Bilateral  [ ]Crackles        [ ]Right [ ]Left [ ]Bilateral  [ ]Wheezing     [ ]Right [ ]Left [ ]Bilateral  CARDIOVASCULAR:    [x ]Regular [ ]Irregular [ ]Tachy  [ ]Jonathan [ ]Murmur [ ]Other  GASTROINTESTINAL:  [x ]Soft  [ ]Distended   [x ]+BS  [x ]Non tender [ ]Tender  [ ]PEG [ ]OGT/ NGT  Last BM: GENITOURINARY:  [ ]Normal [ ] Incontinent   [ ]Oliguria/Anuria   [ x]Yun  MUSCULOSKELETAL:   [ ]Normal   [ ]Weakness  [ x]Bed/Wheelchair bound [ ]Edema  NEUROLOGIC:   [ ]No focal deficits  [ x] Cognitive impairment  [ ] Dysphagia [ ]Dysarthria [ ] Paresis [ x]Other   SKIN:   [ x]Normal   [ ]Pressure ulcer(s)  [ ]Rash    CRITICAL CARE:  [ ] Shock Present  [ ]Septic [ ]Cardiogenic [ ]Neurologic [ ]Hypovolemic  [ ]  Vasopressors [ ]  Inotropes   [ ] Respiratory failure present  [ ] Acute  [ ] Chronic [ ] Hypoxic  [ ] Hypercarbic [ ] Other  [ ] Other organ failure     LABS:                        9.1    6.52  )-----------( 440      ( 11 Oct 2021 14:45 )             27.6   10-11    131<L>  |  104  |  15  ----------------------------<  96  4.8   |  15<L>  |  1.99<H>    Ca    8.8      11 Oct 2021 14:45    TPro  7.2  /  Alb  3.2<L>  /  TBili  0.3  /  DBili  x   /  AST  14  /  ALT  <5<L>  /  AlkPhos  72  10-      Urinalysis Basic - ( 11 Oct 2021 15:50 )    Color: Yellow / Appearance: SL Cloudy / S.015 / pH: x  Gluc: x / Ketone: NEGATIVE  / Bili: Negative / Urobili: 0.2 E.U./dL   Blood: x / Protein: NEGATIVE mg/dL / Nitrite: NEGATIVE   Leuk Esterase: Large / RBC: < 5 /HPF / WBC Many /HPF   Sq Epi: x / Non Sq Epi: 0-5 /HPF / Bacteria: Many /HPF      RADIOLOGY & ADDITIONAL STUDIES:    PROTEIN CALORIE MALNUTRITION PRESENT: [ ] Yes [ ] No  [ ] PPSV2 < or = to 30% [ ] significant weight loss  [ ] poor nutritional intake [ ] catabolic state [ ] anasarca     Artificial Nutrition [ ]     REFERRALS:   [ ]Chaplaincy  [ ] Hospice  [ ]Child Life  [ ]Social Work  [ ]Case management [ ]Holistic Therapy   Goals of Care Discussion Document:

## 2021-10-11 NOTE — CHART NOTE - NSCHARTNOTEFT_GEN_A_CORE
Patient seen and examined.  Full consult to follow.  Patient known to Palliative Care service from previous admission.  Advanced Alzheimer's dementia, presents with dehydration and UTI.   As per son, requesting a day of IV hydration, and a course of oral antibiotics and antiemetics, with goal of discharge back with hospice services tomorrow.   [facility].  DNR/DNI. MOLST in chart. PALLIATIVE MEDICINE COORDINATION OF CARE NOTE FOR LOKESH CORREA  [  ] ED Trigger   [  ] MICU Trigger     [x  ] Consult    Patient last assessed: __10/11/21___  to manage: GOC/AD, Symptoms, and Support was recommended:__10/11/21____    ____40__ Minutes; Start: __1320___  End: __1400__, of non-face-to-face prolonged service provided that relates to (face-to-face) care that has or will occur and ongoing patient management, including one or more of the following:   - Reviewed records from other physicians or other health care professional services, including one or more of the following: other medical records and diagnostic / radiology study results     HPI:      - Other: iStop reviewed.    - Other: Medication reviewed.    The patient HAS NOT used PRN's in the last 24h.    MEDICATIONS  (STANDING):    MEDICATIONS  (PRN):      - Other: Advanced directives     DNR DNI     No documented MOLST form found on [facility]     No documented HCP form found on [facility]     No Living will / POA / Advance directives found on [facility] / [facility].     No documented GOC notes on [facility]    - Other: Coordination/Plan of care     __2_ admissions in 1 year     Current admission LOS: __1_ days     LACE score: _14___ ADVANCE ILLNESS PATIENT.     Patient NOT previously seen by palliative medicine consult service.      Discussed with  Consult request for: " Advanced Dementia  "    Patient is an Advanced Illness patient hence the primary team will need to complete GOC document of any prior GOC discussions that were done during this admission so far as well as information available for Proxy/surrogates/NOK/guardians prior to a palliative contact.    Full consult to follow within 24h.    Will reassess later today during bedside rounds.

## 2021-10-11 NOTE — ED PROVIDER NOTE - CLINICAL SUMMARY MEDICAL DECISION MAKING FREE TEXT BOX
here w/ failure to thrive at home despite home hospice  will discuss case w/ case management, palliative  have  exchange yun  son prefers to take pt home, mainly wants better symptom management at home, feels overwhelmed, and feels hospice rn not doing enough  would not treat uti at this time given lack of lactate, elevated wbc, or fever

## 2021-10-11 NOTE — ED PROVIDER NOTE - OBJECTIVE STATEMENT
87 yo M with past medical history of dementia (AAOx1 at baseline, bedbound), BPH with chronic yun, recurrent UTIs, DM2, CKD stage 4, neuropathy, chronic hyponatremia, cervical spine dz w/ chronic pain, recently admitted and dc last month for decreased po intake, asymptomatic bacteriuria, presents today w/ N/V inability to tolerate po, dc to home hospice. only 10 hours a day, son primary caregiver, still working full time and struggling. 2 weeks with poor po intake, and vomiting. Hospice RN sent UA +infection so started on macrobid last night. son frustrated with hospice care. pt started on rectal anti nausea meds without improvement as well so came in. no exchange of yun since last hospital admission. no fevers at home, son states has had the RN check daily.   DNR/DNI.  supposed to see  as outpatient but pt unable to go to clinic for appt

## 2021-10-11 NOTE — CONSULT NOTE ADULT - ASSESSMENT
86 M with advanced Alzheimer's dementia, FTT, functional quadriplegia, encounter for palliative care.

## 2021-10-11 NOTE — ED ADULT TRIAGE NOTE - CHIEF COMPLAINT QUOTE
85 y/o male BIBEMS for evaluation of vomiting and poor po intake, hx of alzheimer's disease. Recently treated for UTI.

## 2021-10-12 DIAGNOSIS — Z51.5 ENCOUNTER FOR PALLIATIVE CARE: ICD-10-CM

## 2021-10-12 DIAGNOSIS — R53.2 FUNCTIONAL QUADRIPLEGIA: ICD-10-CM

## 2021-10-12 DIAGNOSIS — Z71.89 OTHER SPECIFIED COUNSELING: ICD-10-CM

## 2021-10-12 DIAGNOSIS — G30.1 ALZHEIMER'S DISEASE WITH LATE ONSET: ICD-10-CM

## 2021-10-12 DIAGNOSIS — R62.7 ADULT FAILURE TO THRIVE: ICD-10-CM

## 2021-10-13 LAB
-  AMPICILLIN/SULBACTAM: SIGNIFICANT CHANGE UP
-  AMPICILLIN: SIGNIFICANT CHANGE UP
-  CEFAZOLIN: SIGNIFICANT CHANGE UP
-  CEFTRIAXONE: SIGNIFICANT CHANGE UP
-  CIPROFLOXACIN: SIGNIFICANT CHANGE UP
-  ERTAPENEM: SIGNIFICANT CHANGE UP
-  GENTAMICIN: SIGNIFICANT CHANGE UP
-  NITROFURANTOIN: SIGNIFICANT CHANGE UP
-  PIPERACILLIN/TAZOBACTAM: SIGNIFICANT CHANGE UP
-  TOBRAMYCIN: SIGNIFICANT CHANGE UP
-  TRIMETHOPRIM/SULFAMETHOXAZOLE: SIGNIFICANT CHANGE UP
CULTURE RESULTS: SIGNIFICANT CHANGE UP
METHOD TYPE: SIGNIFICANT CHANGE UP
ORGANISM # SPEC MICROSCOPIC CNT: SIGNIFICANT CHANGE UP
ORGANISM # SPEC MICROSCOPIC CNT: SIGNIFICANT CHANGE UP
SPECIMEN SOURCE: SIGNIFICANT CHANGE UP

## 2021-10-14 RX ORDER — AZTREONAM 2 G
1 VIAL (EA) INJECTION
Qty: 14 | Refills: 0
Start: 2021-10-14 | End: 2021-10-20

## 2021-10-14 NOTE — ED POST DISCHARGE NOTE - DETAILS
d/w son regarding results and need for antibiotics.  bactrim sent to pharmacy. + vomiting. no fever. son will d/w pmd. d/w son if unable to keep anitbiotic down would need to return to ED for IV antibiotics.

## 2021-11-10 NOTE — DISCHARGE NOTE NURSING/CASE MANAGEMENT/SOCIAL WORK - BRAND OF COVID-19 VACCINATION
Patient has not attended OT Groups. Will be given a self assessment form. OT staff will explain there value of OT, including them in their tx plan and offer options for meeting their needs and identifying goals.   Moderna dose 1 and 2

## 2021-12-18 NOTE — ED PROCEDURE NOTE - CPROC ED INFORMED CONSENT1
Benefits, risks, and possible complications of procedure explained to patient/caregiver who verbalized understanding and gave verbal consent. yes

## 2022-10-11 NOTE — ED PROVIDER NOTE - NS ED MD DISPO DISCHARGE
Visit Discharge/Physician Orders     Discharge condition: Stable     Assessment of pain at discharge: yes     Anesthetic used: 4% lidocaine solution     Discharge to: Home     Left via:Private automobile     Accompanied by:self     ECF/HHA: n/a     Dressing Orders:LEFT MEDIAL and LATERAL LOWER LEG-Cleanse with normal saline, aquacel AG,  ABD pad and dry dressing. Change weekly. Apply spandagrip. On in am and off in pm. Elevate legs as much as possible above level of the heart. Treatment Orders: Eat a diet high in protein and vitamin C. Take a multiple vitamin daily unless contraindicated. To see Dr. Sandy Cardoso as scheduled      Must wear slip on shoe to work due to wrap on leg! [address] [facility] followup visit : 1 week_____________________________  (Please note your next appointment above and if you are unable to keep, kindly give a 24 hour notice. Thank you.)     Physician signature:__________________________     If you experience any of the following, please call the [facility] during business hours:     * Increase in Pain  * Temperature over 101  * Increase in drainage from your wound  * Drainage with a foul odor  * Bleeding  * Increase in swelling  * Need for compression bandage changes due to slippage, breakthrough drainage. If you need medical attention outside of the business hours of the [facility] please contact your PCP or go to the nearest emergency room.
Home

## 2025-04-02 NOTE — ED ADULT NURSE NOTE - NSFALLRSKOUTCOME_ED_ALL_ED
CM reviewed Pt medicals, Pt lives with her  and was IND with ADL.    Will need PT/OT eval/recommendations.        Fall with Harm Risk

## 2025-06-24 NOTE — ED ADULT NURSE NOTE - TEMPLATE LIST FOR HEAD TO TOE ASSESSMENT
Goal Outcome Evaluation:  Plan of Care Reviewed With: patient        Progress: improving                                 General